# Patient Record
Sex: FEMALE | Race: WHITE | Employment: FULL TIME | URBAN - METROPOLITAN AREA
[De-identification: names, ages, dates, MRNs, and addresses within clinical notes are randomized per-mention and may not be internally consistent; named-entity substitution may affect disease eponyms.]

---

## 2023-09-06 ENCOUNTER — OFFICE VISIT (OUTPATIENT)
Age: 33
End: 2023-09-06
Payer: OTHER GOVERNMENT

## 2023-09-06 VITALS
WEIGHT: 209 LBS | SYSTOLIC BLOOD PRESSURE: 132 MMHG | DIASTOLIC BLOOD PRESSURE: 82 MMHG | HEART RATE: 83 BPM | TEMPERATURE: 98 F

## 2023-09-06 DIAGNOSIS — K59.00 CONSTIPATION, UNSPECIFIED CONSTIPATION TYPE: ICD-10-CM

## 2023-09-06 DIAGNOSIS — K64.9 HEMORRHOIDS, UNSPECIFIED HEMORRHOID TYPE: ICD-10-CM

## 2023-09-06 DIAGNOSIS — K62.5 PAINLESS RECTAL BLEEDING: ICD-10-CM

## 2023-09-06 DIAGNOSIS — Z78.9 NEVER SMOKED TOBACCO: ICD-10-CM

## 2023-09-06 DIAGNOSIS — K62.5 BRIGHT RED BLOOD PER RECTUM: ICD-10-CM

## 2023-09-06 DIAGNOSIS — K21.9 GASTROESOPHAGEAL REFLUX DISEASE, UNSPECIFIED WHETHER ESOPHAGITIS PRESENT: Primary | ICD-10-CM

## 2023-09-06 PROBLEM — E28.2 PCOS (POLYCYSTIC OVARIAN SYNDROME): Status: ACTIVE | Noted: 2023-09-06

## 2023-09-06 PROBLEM — J45.909 ASTHMA: Status: ACTIVE | Noted: 2023-09-06

## 2023-09-06 PROCEDURE — 99214 OFFICE O/P EST MOD 30 MIN: CPT | Performed by: FAMILY MEDICINE

## 2023-09-06 PROCEDURE — 46600 DIAGNOSTIC ANOSCOPY SPX: CPT | Performed by: STUDENT IN AN ORGANIZED HEALTH CARE EDUCATION/TRAINING PROGRAM

## 2023-09-06 PROCEDURE — 46600 DIAGNOSTIC ANOSCOPY SPX: CPT | Performed by: FAMILY MEDICINE

## 2023-09-06 RX ORDER — PANTOPRAZOLE SODIUM 20 MG/1
40 TABLET, DELAYED RELEASE ORAL
Qty: 180 TABLET | Refills: 0 | Status: SHIPPED | OUTPATIENT
Start: 2023-09-06 | End: 2023-12-05

## 2023-09-06 RX ORDER — TRANEXAMIC ACID 650 MG/1
1300 TABLET ORAL SEE ADMIN INSTRUCTIONS
COMMUNITY

## 2023-09-06 RX ORDER — FAMOTIDINE 10 MG
10 TABLET ORAL DAILY
COMMUNITY
End: 2023-09-06 | Stop reason: ALTCHOICE

## 2023-09-06 SDOH — ECONOMIC STABILITY: FOOD INSECURITY: WITHIN THE PAST 12 MONTHS, THE FOOD YOU BOUGHT JUST DIDN'T LAST AND YOU DIDN'T HAVE MONEY TO GET MORE.: NEVER TRUE

## 2023-09-06 SDOH — ECONOMIC STABILITY: INCOME INSECURITY: HOW HARD IS IT FOR YOU TO PAY FOR THE VERY BASICS LIKE FOOD, HOUSING, MEDICAL CARE, AND HEATING?: NOT HARD AT ALL

## 2023-09-06 SDOH — ECONOMIC STABILITY: HOUSING INSECURITY
IN THE LAST 12 MONTHS, WAS THERE A TIME WHEN YOU DID NOT HAVE A STEADY PLACE TO SLEEP OR SLEPT IN A SHELTER (INCLUDING NOW)?: NO

## 2023-09-06 SDOH — ECONOMIC STABILITY: FOOD INSECURITY: WITHIN THE PAST 12 MONTHS, YOU WORRIED THAT YOUR FOOD WOULD RUN OUT BEFORE YOU GOT MONEY TO BUY MORE.: NEVER TRUE

## 2023-09-06 ASSESSMENT — PATIENT HEALTH QUESTIONNAIRE - PHQ9
SUM OF ALL RESPONSES TO PHQ QUESTIONS 1-9: 0
2. FEELING DOWN, DEPRESSED OR HOPELESS: 0
SUM OF ALL RESPONSES TO PHQ QUESTIONS 1-9: 0
SUM OF ALL RESPONSES TO PHQ9 QUESTIONS 1 & 2: 0
1. LITTLE INTEREST OR PLEASURE IN DOING THINGS: 0
SUM OF ALL RESPONSES TO PHQ QUESTIONS 1-9: 0
SUM OF ALL RESPONSES TO PHQ QUESTIONS 1-9: 0

## 2023-09-06 NOTE — PROGRESS NOTES
[address] [facility]  [address] [facility] [place] [address] [facility]  Phone: [phone]  Fax: [phone]      Date of Visit: 23  Patient Name: Leonor Lowe   Patient :  1990     ASSESSMENT/PLAN   1. Gastroesophageal reflux disease, unspecified whether esophagitis present  Assessment & Plan:  - switched pt from OTC Pepcid 10mg qd to Protonix 40mg qd  - pt referred to GI for worsening GERD as well as painless BRBPR  Orders:  -     Blood Occult Stool Diagnostic; Future  -     CBC with Auto Differential; Future  -     Baldemar Cooper MD, Gastroenterology, [place]  -     pantoprazole (PROTONIX) 20 MG tablet; Take 2 tablets by mouth every morning (before breakfast), Disp-180 tablet, R-0Normal  The following orders have not been finalized:  -     pantoprazole (PROTONIX) 20 MG tablet  2. Bright red blood per rectum  -     Blood Occult Stool Diagnostic; Future  -     CBC with Auto Differential; Future  -     10197 - [facility]  -     Baldemar Cooper MD, Gastroenterology, [place]  3. Painless rectal bleeding  -     Blood Occult Stool Diagnostic; Future  -     CBC with Auto Differential; Future  -     45847 - [facility]  -     Baldemar Cooper MD, Gastroenterology, [place]  4. Hemorrhoids, unspecified hemorrhoid type  -     Blood Occult Stool Diagnostic; Future  -     CBC with Auto Differential; Future  -     60692 - [facility]  -     Gladys Lyons MD, Gastroenterology, [place]       Return in about 7 weeks (around 10/25/2023) for GI specialist follow-up/routine check. HPI    Leonor Lowe is a 28 y.o. female who presents today to discuss   Chief Complaint   Patient presents with    Rectal Bleeding   Presents with complaints of GERD symptoms, slowly worsening over the past year (since pregnancy/delivery of youngest child).   Symptoms experienced include burning sensation in chest and 81.6

## 2023-09-06 NOTE — ASSESSMENT & PLAN NOTE
- switched pt from OTC Pepcid 10mg qd to Protonix 40mg qd  - pt referred to GI for worsening GERD as well as painless BRBPR

## 2023-09-06 NOTE — PATIENT INSTRUCTIONS
Thank you for following up with us at [address] [facility]. office! It was a pleasure to meet you today! Our plan is the following:  - I have prescribed you Protonix 40 mg. This medication will replace the Pepcid that you are taking in the evening. You can continue taking it in the evening.    - I have generated a referral to a [facility] gastroenterologist. If you have not heard from them by Friday afternoon/Monday morning, you can call the number on the order sheet and schedule an appointment to get established. If you have any issues getting scheduled, please call the office to let me know. - I have ordered some lab work to make sure you are not anemic as a result of the bleeding you are experiencing. You can get this done at any lab as long as you have the paperwork in hand. Your medication list is included in the after visit summary. If you find any differences when compared to your medications at home, please contact the office ([phone]) to let us know. You can also call the office if you have any additional questions or concerns and speak to one of the staff. They will triage and forward the message to the provider. Have a great rest of your day!

## 2023-09-30 ENCOUNTER — HOSPITAL ENCOUNTER (EMERGENCY)
Age: 33
Discharge: HOME | End: 2023-09-30
Attending: EMERGENCY MEDICINE
Payer: OTHER GOVERNMENT

## 2023-09-30 VITALS
DIASTOLIC BLOOD PRESSURE: 106 MMHG | WEIGHT: 200 LBS | TEMPERATURE: 98.1 F | HEART RATE: 82 BPM | RESPIRATION RATE: 18 BRPM | HEIGHT: 64 IN | OXYGEN SATURATION: 99 % | BODY MASS INDEX: 34.15 KG/M2 | SYSTOLIC BLOOD PRESSURE: 154 MMHG

## 2023-09-30 DIAGNOSIS — T63.441A BEE STING REACTION, ACCIDENTAL OR UNINTENTIONAL, INITIAL ENCOUNTER: Primary | ICD-10-CM

## 2023-09-30 PROCEDURE — 99284 EMERGENCY DEPT VISIT MOD MDM: CPT

## 2023-09-30 PROCEDURE — 6370000000 HC RX 637 (ALT 250 FOR IP): Performed by: REGISTERED NURSE

## 2023-09-30 PROCEDURE — 96372 THER/PROPH/DIAG INJ SC/IM: CPT

## 2023-09-30 PROCEDURE — 6360000002 HC RX W HCPCS: Performed by: REGISTERED NURSE

## 2023-09-30 RX ORDER — FAMOTIDINE 20 MG/1
20 TABLET, FILM COATED ORAL 2 TIMES DAILY
Qty: 14 TABLET | Refills: 0 | Status: SHIPPED | OUTPATIENT
Start: 2023-09-30 | End: 2023-10-07

## 2023-09-30 RX ORDER — METHYLPREDNISOLONE 4 MG/1
TABLET ORAL
Qty: 1 KIT | Refills: 0 | Status: SHIPPED | OUTPATIENT
Start: 2023-09-30 | End: 2023-10-06

## 2023-09-30 RX ORDER — DEXAMETHASONE SODIUM PHOSPHATE 10 MG/ML
10 INJECTION, SOLUTION INTRAMUSCULAR; INTRAVENOUS ONCE
Status: COMPLETED | OUTPATIENT
Start: 2023-09-30 | End: 2023-09-30

## 2023-09-30 RX ORDER — FAMOTIDINE 20 MG/1
20 TABLET, FILM COATED ORAL ONCE
Status: COMPLETED | OUTPATIENT
Start: 2023-09-30 | End: 2023-09-30

## 2023-09-30 RX ADMIN — DEXAMETHASONE SODIUM PHOSPHATE 10 MG: 10 INJECTION, SOLUTION INTRAMUSCULAR; INTRAVENOUS at 17:57

## 2023-09-30 RX ADMIN — FAMOTIDINE 20 MG: 20 TABLET, FILM COATED ORAL at 17:57

## 2023-09-30 ASSESSMENT — ENCOUNTER SYMPTOMS
BACK PAIN: 0
FACIAL SWELLING: 1
ABDOMINAL PAIN: 0
COUGH: 0
DIARRHEA: 0
VOMITING: 0
NAUSEA: 0
SHORTNESS OF BREATH: 0

## 2023-09-30 ASSESSMENT — PAIN - FUNCTIONAL ASSESSMENT: PAIN_FUNCTIONAL_ASSESSMENT: NONE - DENIES PAIN

## 2023-09-30 NOTE — ED PROVIDER NOTES
[facility] Emergency Department  [address] [facility] [address]  Phone: [phone]  Fax: [phone]        Pt Name: Saul Quintanilla  MRN: [id number]  [address] [facility] [place] 1990  Date of evaluation: 9/30/23    [address] [facility]       Chief Complaint   Patient presents with    Allergic Reaction       HISTORY OF PRESENT ILLNESS (Location/Symptom, Timing/Onset, Context/Setting, Quality, Duration, Modifying Factors, Severity)      Saul Quintanilla is a 28 y.o. female with no pertinent PMH who presents to the ED via EMS with allergic reaction. Patient states approximately hour and half prior arrival she was stung by bee while drinking a beer and the bee stung her and her lips. She decided to go to a local urgent care due to increased swelling and noticed a hive to her face. She did take 2 Benadryl at home. She had to the urgent care they gave her a dose of epi, called EMS and brought her to the ER to be evaluated. She does report allergy to bees when she was younger but nothing significant. She denies any headache, dizziness, chest pain, shortness of breath, difficulty breathing, dumping nausea vomiting or diarrhea. On arrival she is resting on the cot comfortably with even unlabored breaths is nontoxic-appearing with no acute distress noted speaking full complete sentences. PAST MEDICAL / SURGICAL / SOCIAL / FAMILY HISTORY     PMH:  has no past medical history on file. Surgical History:  has no past surgical history on file. Social History:  reports that she has never smoked. She has never used smokeless tobacco. She reports that she does not use drugs. Family History: has no family status information on file. family history is not on file. Psychiatric History: None    Allergies: Bee venom and Sulfa antibiotics    Home Medications:   Prior to Admission medications    Medication Sig Start Date End Date Taking?  Authorizing Provider   methylPREDNISolone (MEDROL, ABDI,) 4 MG tablet discharge. PATIENT REFERRED TO:  [facility] Emergency Department  [address] [facility] [address] [id number] [phone]    If symptoms worsen      Please call your PCP in one day for follow up. If you do not have a PCP you can Call [phone] ([phone]) to be established with a PCP.           DISCHARGE MEDICATIONS:  Discharge Medication List as of 9/30/2023  7:16 PM        START taking these medications    Details   methylPREDNISolone (MEDROL, ABDI,) 4 MG tablet Take by mouth., Disp-1 kit, R-0Normal      famotidine (PEPCID) 20 MG tablet Take 1 tablet by mouth 2 times daily for 7 days, Disp-14 tablet, R-0Normal             OWEN Keith CNP   Emergency Medicine Nurse Practitioner    (Please note that portions of this note were completed with a voice recognition program.  Efforts were made to edit the dictations but occasionally words aremis-transcribed.)       OWEN Keith CNP  10/01/23 0028

## 2023-10-09 ENCOUNTER — TELEPHONE (OUTPATIENT)
Age: 33
End: 2023-10-09

## 2023-10-09 ENCOUNTER — OFFICE VISIT (OUTPATIENT)
Age: 33
End: 2023-10-09
Payer: OTHER GOVERNMENT

## 2023-10-09 VITALS
RESPIRATION RATE: 16 BRPM | DIASTOLIC BLOOD PRESSURE: 75 MMHG | OXYGEN SATURATION: 100 % | BODY MASS INDEX: 35.61 KG/M2 | HEART RATE: 85 BPM | WEIGHT: 208.6 LBS | SYSTOLIC BLOOD PRESSURE: 118 MMHG | HEIGHT: 64 IN | TEMPERATURE: 97.7 F

## 2023-10-09 DIAGNOSIS — J45.21 MILD INTERMITTENT ASTHMA WITH EXACERBATION: Primary | ICD-10-CM

## 2023-10-09 DIAGNOSIS — Z78.9 NONSMOKER: ICD-10-CM

## 2023-10-09 DIAGNOSIS — Z91.030 BEE STING ALLERGY: ICD-10-CM

## 2023-10-09 DIAGNOSIS — J45.21 MILD INTERMITTENT ASTHMA WITH EXACERBATION: ICD-10-CM

## 2023-10-09 PROCEDURE — 99211 OFF/OP EST MAY X REQ PHY/QHP: CPT

## 2023-10-09 PROCEDURE — 99214 OFFICE O/P EST MOD 30 MIN: CPT | Performed by: STUDENT IN AN ORGANIZED HEALTH CARE EDUCATION/TRAINING PROGRAM

## 2023-10-09 RX ORDER — FLUTICASONE PROPIONATE AND SALMETEROL 250; 50 UG/1; UG/1
1 POWDER RESPIRATORY (INHALATION) EVERY 12 HOURS
Qty: 60 EACH | Refills: 3 | Status: SHIPPED | OUTPATIENT
Start: 2023-10-09 | End: 2023-10-10 | Stop reason: SDUPTHER

## 2023-10-09 RX ORDER — EPINEPHRINE 0.3 MG/.3ML
0.3 INJECTION SUBCUTANEOUS ONCE
Qty: 0.6 ML | Refills: 0 | Status: SHIPPED | OUTPATIENT
Start: 2023-10-09 | End: 2023-10-09

## 2023-10-09 RX ORDER — FEXOFENADINE HCL 180 MG/1
180 TABLET ORAL DAILY
Qty: 30 TABLET | Refills: 3 | Status: SHIPPED | OUTPATIENT
Start: 2023-10-09 | End: 2023-11-08

## 2023-10-09 SDOH — ECONOMIC STABILITY: TRANSPORTATION INSECURITY
IN THE PAST 12 MONTHS, HAS LACK OF TRANSPORTATION KEPT YOU FROM MEETINGS, WORK, OR FROM GETTING THINGS NEEDED FOR DAILY LIVING?: NO

## 2023-10-09 SDOH — ECONOMIC STABILITY: INCOME INSECURITY: IN THE LAST 12 MONTHS, WAS THERE A TIME WHEN YOU WERE NOT ABLE TO PAY THE MORTGAGE OR RENT ON TIME?: NO

## 2023-10-09 SDOH — HEALTH STABILITY: PHYSICAL HEALTH: ON AVERAGE, HOW MANY MINUTES DO YOU ENGAGE IN EXERCISE AT THIS LEVEL?: 30 MIN

## 2023-10-09 SDOH — ECONOMIC STABILITY: TRANSPORTATION INSECURITY
IN THE PAST 12 MONTHS, HAS THE LACK OF TRANSPORTATION KEPT YOU FROM MEDICAL APPOINTMENTS OR FROM GETTING MEDICATIONS?: NO

## 2023-10-09 SDOH — HEALTH STABILITY: PHYSICAL HEALTH: ON AVERAGE, HOW MANY DAYS PER WEEK DO YOU ENGAGE IN MODERATE TO STRENUOUS EXERCISE (LIKE A BRISK WALK)?: 2 DAYS

## 2023-10-09 ASSESSMENT — LIFESTYLE VARIABLES
HOW MANY STANDARD DRINKS CONTAINING ALCOHOL DO YOU HAVE ON A TYPICAL DAY: 1 OR 2
HOW OFTEN DO YOU HAVE A DRINK CONTAINING ALCOHOL: 2-4 TIMES A MONTH

## 2023-10-09 NOTE — PROGRESS NOTES
[address] 100 Medicine Residency  [address] [facility], [address]  Phone: [phone]  Fax: [phone]      Date of Visit:  10/9/23   Patient Name: Jayson Gupta   Patient :  1990     HPI:     Jayson Gupta is a 28 y.o. female with   Patient Active Problem List   Diagnosis    Gastroesophageal reflux disease    Hemorrhoids    Painless rectal bleeding    Asthma    PCOS (polycystic ovarian syndrome)    Never smoked tobacco     who presents today to discuss   Chief Complaint   Patient presents with    Asthma     Flare up since last Thursday. Patient uses Pro air. Feels tightness in chest.        HPI:     Patient reports today that she feels she is having a possible active exacerbation. She has been using her albuterol inhaler every 6 hours yesterday and more frequently than usual with days before. Chest just feels tight, having some shortness of breath with exertion. Mother possibly noticed some wheezing while sitting with her yesterday, though patient denies any wheezing. No recent illness. No fevers. She is unsure if her allergies are contributing. The albuterol has significantly been helping the subjective shortness of breath. Was stung by a bee in the mouth on . Given epinephrine due to lip swelling. No true shortness of breath or other symptoms. Was on a Medrol dose pack for this up until Friday. She is requesting an EpiPen prescription today    Had pretty bad asthma all through childhood. Was on a maintenance inhaler for much of childhood. Has not been on a maintenance medication for the last several years. Normally does not require her albuterol. Patient denies any lightheadedness, dizziness, headaches, chest pain, palpitations, shortness of breath at rest, nausea, vomiting, numbness or tingling.     REVIEW OF SYSTEM      As in HPI    REVIEWED INFORMATION      Current Outpatient Medications   Medication Sig Dispense Refill    albuterol No

## 2023-10-09 NOTE — PATIENT INSTRUCTIONS
Thank for coming in today, Noemi Garzon. I am sending Allegra to your pharmacy as well as a new inhaler called [address] [place] [address] which is the new guideline directed therapy for asthma. Use your albuterol inhaler as needed while taking this. Call us with worsening or worsening wheezing. At this point, we will not prescribe steroids. Keep your appointment with Dr. Julia Mejia and you can discuss ordering a PFT or additional asthma workup at that time.

## 2023-10-10 RX ORDER — FLUTICASONE PROPIONATE AND SALMETEROL 250; 50 UG/1; UG/1
1 POWDER RESPIRATORY (INHALATION) EVERY 12 HOURS
Qty: 60 EACH | Refills: 3 | Status: SHIPPED | OUTPATIENT
Start: 2023-10-10

## 2023-10-23 ENCOUNTER — OFFICE VISIT (OUTPATIENT)
Dept: GASTROENTEROLOGY | Age: 33
End: 2023-10-23
Payer: OTHER GOVERNMENT

## 2023-10-23 VITALS
SYSTOLIC BLOOD PRESSURE: 141 MMHG | BODY MASS INDEX: 35.7 KG/M2 | WEIGHT: 208 LBS | DIASTOLIC BLOOD PRESSURE: 96 MMHG | TEMPERATURE: 97.5 F

## 2023-10-23 DIAGNOSIS — K62.5 PAINLESS RECTAL BLEEDING: Primary | ICD-10-CM

## 2023-10-23 DIAGNOSIS — K62.5 RECTAL BLEEDING: ICD-10-CM

## 2023-10-23 DIAGNOSIS — Z80.0 FAMILY HISTORY OF COLON CANCER: ICD-10-CM

## 2023-10-23 DIAGNOSIS — K21.9 GASTROESOPHAGEAL REFLUX DISEASE, UNSPECIFIED WHETHER ESOPHAGITIS PRESENT: ICD-10-CM

## 2023-10-23 PROCEDURE — 99204 OFFICE O/P NEW MOD 45 MIN: CPT | Performed by: INTERNAL MEDICINE

## 2023-10-23 RX ORDER — BISACODYL 5 MG/1
TABLET, DELAYED RELEASE ORAL
Qty: 4 TABLET | Refills: 0 | Status: SHIPPED | OUTPATIENT
Start: 2023-10-23

## 2023-10-23 RX ORDER — POLYETHYLENE GLYCOL 3350 17 G/17G
POWDER, FOR SOLUTION ORAL
Qty: 238 G | Refills: 0 | Status: SHIPPED | OUTPATIENT
Start: 2023-10-23

## 2023-10-23 ASSESSMENT — ENCOUNTER SYMPTOMS
BLOOD IN STOOL: 0
SHORTNESS OF BREATH: 0
CONSTIPATION: 0
ANAL BLEEDING: 0
TROUBLE SWALLOWING: 0
CHOKING: 0
NAUSEA: 0
VOMITING: 0
DIARRHEA: 0
VOICE CHANGE: 0
ABDOMINAL DISTENTION: 0
COUGH: 0
ABDOMINAL PAIN: 0
WHEEZING: 0
SORE THROAT: 0
RECTAL PAIN: 0

## 2023-10-23 NOTE — PROGRESS NOTES
GI NEW PATIENT OFFICE VISIT    INTERVAL HISTORY:   Ryan Erickson MD  [address] [facility],  [address] [facility]    Chief Complaint   Patient presents with    Hematochezia     Pt is here today as a NP due to BRBPR, hemorrhoids, & GERD. 1. Painless rectal bleeding    2. Gastroesophageal reflux disease, unspecified whether esophagitis present    3. Rectal bleeding    4. Family history of colon cancer      This patient evaluated in my office for the first time  She is a pleasant 27-year-old female patient has history significant for chronic acid reflux  Lately has been having some fresh rectal bleeding  Was recently evaluated primary care MD office blood is fresh suspected to have hemorrhoids? Has history for acid reflux  Had GI work-up done in the past  Has family history for colon cancer  No significant weight loss loss of appetite  Mild irritable bowel syndrome-like symptoms with abdominal bloating and gas  No smoking alcohol abuse illicit drug usage  Available records were reviewed with her      HISTORY OF PRESENT ILLNESS: Ms.Lindsey Cristofer Vargas is a 28 y.o. female with a past history remarkable for , referred for evaluation of   Chief Complaint   Patient presents with    Hematochezia     Pt is here today as a NP due to BRBPR, hemorrhoids, & GERD. Tim Pearce Past Medical,Family, and Social History reviewed and does contribute to the patient presenting condition. Patient's PMH/PSH,SH,PSYCH Hx, MEDs, ALLERGIES, and ROS were all reviewed and updated in the appropriate sections.     PAST MEDICAL HISTORY:  Past Medical History:   Diagnosis Date    Gestational hypertension     Migraines        Past Surgical History:   Procedure Laterality Date    WISDOM TOOTH EXTRACTION  2007       CURRENT MEDICATIONS:    Current Outpatient Medications:     fluticasone-salmeterol (ADVAIR DISKUS) 250-50 MCG/ACT AEPB diskus inhaler, Inhale 1 puff into the lungs in the morning and 1 puff in the evening., Disp: 60 each, Rfl: 3

## 2023-11-30 ENCOUNTER — TELEPHONE (OUTPATIENT)
Dept: GASTROENTEROLOGY | Age: 33
End: 2023-11-30

## 2023-12-01 PROBLEM — J45.20 MILD INTERMITTENT ASTHMA WITHOUT COMPLICATION: Status: ACTIVE | Noted: 2023-09-06

## 2024-02-06 PROBLEM — K64.0 GRADE I INTERNAL HEMORRHOIDS: Status: ACTIVE | Noted: 2023-09-06

## 2024-02-22 NOTE — DISCHARGE INSTRUCTIONS
At this time the patient is without objective evidence of an acute process requiring hospitalization or inpatient management. They have remained hemodynamically stable throughout their entire ED visit and are stable for discharge with outpatient follow-up. The patient understands that at this time there is no evidence for a more malignant underlying process, but the patient also understands that early in the process of an illness or injury, an emergency department workup can be falsely reassuring. Routine discharge counseling was given, and the patient understands that worsening, changing or persistent symptoms should prompt an immediate call or follow up with their primary physician or return to the emergency department. The importance of appropriate follow up was also discussed. I have reviewed the disposition diagnosis with the patient and or their family/guardian. I have answered their questions and given discharge instructions. They voiced understanding of these instructions and did not have any further questions or complaints. Call sooner with any questions or concerns.

## 2024-05-30 LAB — PAP SMEAR, EXTERNAL: NEGATIVE

## 2024-06-17 ENCOUNTER — HOSPITAL ENCOUNTER (OUTPATIENT)
Age: 34
Setting detail: SPECIMEN
Discharge: HOME | End: 2024-06-17

## 2024-06-17 ENCOUNTER — OFFICE VISIT (OUTPATIENT)
Age: 34
End: 2024-06-17
Payer: OTHER GOVERNMENT

## 2024-06-17 VITALS
HEART RATE: 94 BPM | SYSTOLIC BLOOD PRESSURE: 130 MMHG | BODY MASS INDEX: 35.85 KG/M2 | RESPIRATION RATE: 12 BRPM | HEIGHT: 64 IN | DIASTOLIC BLOOD PRESSURE: 83 MMHG | WEIGHT: 210 LBS

## 2024-06-17 DIAGNOSIS — Z80.0 FAMILY HISTORY OF COLON CANCER: ICD-10-CM

## 2024-06-17 DIAGNOSIS — Z78.9 FULL CODE STATUS: ICD-10-CM

## 2024-06-17 DIAGNOSIS — G43.909 MIGRAINE WITHOUT STATUS MIGRAINOSUS, NOT INTRACTABLE, UNSPECIFIED MIGRAINE TYPE: ICD-10-CM

## 2024-06-17 DIAGNOSIS — R51.9 FREQUENT HEADACHES: ICD-10-CM

## 2024-06-17 DIAGNOSIS — Z78.9 NEVER SMOKED TOBACCO: ICD-10-CM

## 2024-06-17 DIAGNOSIS — Z11.59 NEED FOR HEPATITIS C SCREENING TEST: ICD-10-CM

## 2024-06-17 DIAGNOSIS — Z00.00 WELL WOMAN EXAM (NO GYNECOLOGICAL EXAM): Primary | ICD-10-CM

## 2024-06-17 DIAGNOSIS — Z00.00 WELL WOMAN EXAM (NO GYNECOLOGICAL EXAM): ICD-10-CM

## 2024-06-17 DIAGNOSIS — R03.0 ELEVATED BP WITHOUT DIAGNOSIS OF HYPERTENSION: ICD-10-CM

## 2024-06-17 PROBLEM — K21.00 GASTROESOPHAGEAL REFLUX DISEASE WITH ESOPHAGITIS WITHOUT HEMORRHAGE: Status: ACTIVE | Noted: 2023-09-06

## 2024-06-17 LAB
ANION GAP SERPL CALCULATED.3IONS-SCNC: 11 MMOL/L (ref 9–16)
BASOPHILS NFR BLD: 0 % (ref 0–2)
BUN SERPL-MCNC: 18 MG/DL (ref 6–20)
CHLORIDE SERPL-SCNC: 104 MMOL/L (ref 98–107)
CHOLEST SERPL-MCNC: 193 MG/DL (ref 0–199)
CHOLESTEROL/HDL RATIO: 3
CO2 SERPL-SCNC: 23 MMOL/L (ref 20–31)
CREAT SERPL-MCNC: 1 MG/DL (ref 0.5–0.9)
EOSINOPHIL # BLD: 0.27 K/UL (ref 0–0.44)
EOSINOPHILS RELATIVE PERCENT: 3 % (ref 1–4)
ERYTHROCYTE [DISTWIDTH] IN BLOOD BY AUTOMATED COUNT: 12.3 % (ref 11.8–14.4)
EST. AVERAGE GLUCOSE BLD GHB EST-MCNC: 91 MG/DL
GFR, ESTIMATED: 78 ML/MIN/1.73M2
GLUCOSE SERPL-MCNC: 72 MG/DL (ref 74–99)
HBA1C MFR BLD: 4.8 % (ref 4–6)
HCT VFR BLD AUTO: 39 % (ref 36.3–47.1)
HCV AB SERPL QL IA: NONREACTIVE
HDLC SERPL-MCNC: 66 MG/DL
HGB BLD-MCNC: 13.1 G/DL (ref 11.9–15.1)
IMM GRANULOCYTES # BLD AUTO: 0.03 K/UL (ref 0–0.3)
IMM GRANULOCYTES NFR BLD: 0 %
LDLC SERPL CALC-MCNC: 112 MG/DL (ref 0–100)
LYMPHOCYTES NFR BLD: 2.09 K/UL (ref 1.1–3.7)
LYMPHOCYTES RELATIVE PERCENT: 24 % (ref 24–43)
MCH RBC QN AUTO: 30 PG (ref 25.2–33.5)
MCV RBC AUTO: 89.4 FL (ref 82.6–102.9)
MONOCYTES NFR BLD: 0.65 K/UL (ref 0.1–1.2)
MONOCYTES NFR BLD: 7 % (ref 3–12)
NEUTROPHILS NFR BLD: 66 % (ref 36–65)
NEUTS SEG NFR BLD: 5.7 K/UL (ref 1.5–8.1)
NRBC BLD-RTO: 0 PER 100 WBC
PLATELET # BLD AUTO: 298 K/UL (ref 138–453)
PMV BLD AUTO: 11.3 FL (ref 8.1–13.5)
POTASSIUM SERPL-SCNC: 4.1 MMOL/L (ref 3.7–5.3)
SODIUM SERPL-SCNC: 138 MMOL/L (ref 136–145)
TRIGL SERPL-MCNC: 73 MG/DL
VLDLC SERPL CALC-MCNC: 15 MG/DL
WBC OTHER # BLD: 8.8 K/UL (ref 3.5–11.3)

## 2024-06-17 PROCEDURE — 99395 PREV VISIT EST AGE 18-39: CPT | Performed by: FAMILY MEDICINE

## 2024-06-17 PROCEDURE — 99211 OFF/OP EST MAY X REQ PHY/QHP: CPT | Performed by: STUDENT IN AN ORGANIZED HEALTH CARE EDUCATION/TRAINING PROGRAM

## 2024-06-17 SDOH — ECONOMIC STABILITY: INCOME INSECURITY: IN THE LAST 12 MONTHS, WAS THERE A TIME WHEN YOU WERE NOT ABLE TO PAY THE MORTGAGE OR RENT ON TIME?: NO

## 2024-06-17 SDOH — ECONOMIC STABILITY: HOUSING INSECURITY: IN THE LAST 12 MONTHS, HOW MANY PLACES HAVE YOU LIVED?: 1

## 2024-06-17 ASSESSMENT — SOCIAL DETERMINANTS OF HEALTH (SDOH)
HOW OFTEN DO YOU ATTEND CHURCH OR RELIGIOUS SERVICES?: MORE THAN 4 TIMES PER YEAR
HOW OFTEN DO YOU ATTENT MEETINGS OF THE CLUB OR ORGANIZATION YOU BELONG TO?: NEVER
IN A TYPICAL WEEK, HOW MANY TIMES DO YOU TALK ON THE PHONE WITH FAMILY, FRIENDS, OR NEIGHBORS?: MORE THAN THREE TIMES A WEEK
DO YOU BELONG TO ANY CLUBS OR ORGANIZATIONS SUCH AS CHURCH GROUPS UNIONS, FRATERNAL OR ATHLETIC GROUPS, OR SCHOOL GROUPS?: NO
HOW OFTEN DO YOU GET TOGETHER WITH FRIENDS OR RELATIVES?: ONCE A WEEK

## 2024-06-17 ASSESSMENT — PATIENT HEALTH QUESTIONNAIRE - PHQ9
1. LITTLE INTEREST OR PLEASURE IN DOING THINGS: NOT AT ALL
SUM OF ALL RESPONSES TO PHQ9 QUESTIONS 1 & 2: 0
SUM OF ALL RESPONSES TO PHQ QUESTIONS 1-9: 0
2. FEELING DOWN, DEPRESSED OR HOPELESS: NOT AT ALL
SUM OF ALL RESPONSES TO PHQ QUESTIONS 1-9: 0

## 2024-06-17 NOTE — PATIENT INSTRUCTIONS
Thank you for following up with us at [facility] Family Medicine office! It was a pleasure to meet you today!     Our plan is the following:  - Because of your frequent episodes of elevated BP at the urgent care in your OB/GYN, I have ordered some labs usually ordered for hypertension so we can make sure your kidneys are doing okay and we can figure out if you have other risk factors for hypertension. We'll discuss your lab results at your next appointment.    - I will also bring you back in 2 weeks so we can discuss the recent change in your headache/migraines.     - We talked about using apps to track your headache symptoms; the two I like are HeadApp! And Migraine Charanjit. I like using these apps to track migraines and headaches, because it can help you figure out triggers for both. These apps will help you track your symptoms, possible triggers, and will help us determine how many headache versus how many discrete headaches you are having.    - There can be four phases to a migraine- the prodrome, aura, headache, and postdrome. Using the tracker can help us determine if you have a prodrome or aura, which symptoms you experience, and help us figure out when the best time for you to take your migraine abortive. If you'd like more information, you can go to the American [employer] Foundation's website, they have a lot of good information.       From the American Migraine [employer]      I will be graduating at the end of June, but I plan on moving to [facility] ([address] [place], [place] [id number]. Phone number [phone]). If you would like for me to continue being your family physician, I can continue seeing you at that location in September. If you need to be seen before that time, you should call the [facility] office to schedule an appointment.    If you would like to continue being seen at the [facility] Office, you will be reassigned

## 2024-06-17 NOTE — PROGRESS NOTES
95.3 kg (210 lb)   BMI 36.05 kg/m²    Vital signs reviewed. Nursing note reviewed.     General: Alert and oriented, well nourished, in NAD   HEENT: Normocephalic and atraumatic. No scleral icterus or conjunctival injection.   Lungs: Clear to auscultation in bilateral fields, non-labored respiration.  Heart: Normal rate, regular rhythm. No murmur, gallop, or rubs  Gastrointestinal: Abdomen is soft, nontender, and non-distended.   Skin: Skin is warm, dry and appropriate for skin tone. No rashes or lesions noted on exposed skin. No concerning moles or lesions noted.   MSK/Extremities: No edema in bilateral lower extremities. Radial and posterior tib pulses 2+ bilaterally.  Neuro: Awake, alert, and oriented X3. Appears to be at baseline. Normal gait. No difficulties answering questions.   Psych: Cooperative, appropriate mood and affect.    LABS     No visits with results within 3 Month(s) from this visit.   Latest known visit with results is:   No results found for any previous visit.        No results found for this visit on 06/17/24.

## 2024-06-18 LAB
CREAT UR-MCNC: 31 MG/DL (ref 28–217)
MICROALBUMIN UR-MCNC: <12 MG/L (ref 0–20)
MICROALBUMIN/CREAT UR-RTO: NORMAL MCG/MG CREAT (ref 0–25)

## 2024-06-23 PROBLEM — R03.0 ELEVATED BP WITHOUT DIAGNOSIS OF HYPERTENSION: Status: ACTIVE | Noted: 2024-06-23

## 2024-06-23 NOTE — ASSESSMENT & PLAN NOTE
With PMH Of PCOS and pt's report of elevated Bps at other appointments, will check A1c and lipid panel as well as HTN labs  Change in headaches may be d/t increased BP, so will discuss HTN and change in headaches at follow-up

## 2024-06-25 NOTE — PATIENT INSTRUCTIONS
Thank you for following up with us at [facility] Family Medicine office. It was a pleasure to meet you today!     Our plan is the following:  We discussed your labs today  Lipids:  LDL is slightly elevated, continue working on diet and exercise  Besides helping you lose weight, increased physical activity can lower your triglycerides, the most common type of fat in your body, while increasing your HDL levels. Benefits can be seen with as little as 60 minutes of moderate intensity aerobic exercise a week. In terms of diet, try to avoid trans fats, as they can increase LDL cholesterol and lower HDL cholesterol levels. Foods prepared with shortening, such as cakes and cookies, often contain trans fats, as do most fried foods and some margarines. Limit saturated fat, found in meats and full-fat dairy products, as well.  If you smoke, find a way to quit. Smoking lowers HDL levels, especially in women, and increases LDL levels and triglycerides.    Labs  Creatinine is 1.0, would repeat BMP - if elevated would do further workup. Please hydrate before lab  HTN  Continue to monitor BP and bring home readings to next appointment  Bring BP cuff to next appointment to compare size  Migraine   Message your magnesium dose  Can increase magnesium dose for migraine prevention  Consider osteopathic referral to learn techniques for headache  We did prescribe Maxalt 10 mg as needed for migraine headaches, if no improvement or worsening symptoms come in sooner  Discussed preventative options for headaches  Follow up in 2-3 weeks for headaches  Consider MRI if no improvement or worsening symptoms   Call us right away if new symptoms or symptoms change    Your medication list is included in the after visit summary. If you find any differences when compared to your medications at home, please contact the office ([phone]) to let us know.   You can also call the office if you have any additional questions or

## 2024-06-25 NOTE — PROGRESS NOTES
Diagnosis    Gastroesophageal reflux disease with esophagitis without hemorrhage    Grade I internal hemorrhoids    Rectal bleeding    Mild intermittent asthma without complication    PCOS (polycystic ovarian syndrome)    Never smoked tobacco    Family history of colon cancer    Frequent headaches    Migraine without status migrainosus, not intractable    Elevated BP without diagnosis of hypertension       Past Medical History:   Diagnosis Date    Asthma 2000    Gestational hypertension     Migraines     Obesity 2017       Past Surgical History:   Procedure Laterality Date    UPPER GASTROINTESTINAL ENDOSCOPY  January 2024    WISDOM TOOTH EXTRACTION  2007        Social History     Socioeconomic History    Marital status:      Spouse name: None    Number of children: 3    Years of education: None    Highest education level: None   Tobacco Use    Smoking status: Never    Smokeless tobacco: Never   Vaping Use    Vaping Use: Never used   Substance and Sexual Activity    Alcohol use: Yes     Alcohol/week: 2.0 standard drinks of alcohol     Types: 2 Glasses of wine per week     Comment: social drinker. Not an every week occurance    Drug use: Never    Sexual activity: Yes     Partners: Male     Comment: . 3 kids...   Social History Narrative    ** Merged History Encounter **          Social Determinants of Health     Financial Resource Strain: Low Risk  (9/6/2023)    Overall Financial Resource Strain (CARDIA)     Difficulty of Paying Living Expenses: Not hard at all   Transportation Needs: No Transportation Needs (10/9/2023)    PRAPARE - Transportation     Lack of Transportation (Medical): No     Lack of Transportation (Non-Medical): No   Physical Activity: Insufficiently Active (10/9/2023)    Exercise Vital Sign     Days of Exercise per Week: 2 days     Minutes of Exercise per Session: 30 min   Stress: No Stress Concern Present (10/9/2023)    [employer] - Occupational Stress

## 2024-07-03 ENCOUNTER — OFFICE VISIT (OUTPATIENT)
Age: 34
End: 2024-07-03
Payer: OTHER GOVERNMENT

## 2024-07-03 VITALS
SYSTOLIC BLOOD PRESSURE: 131 MMHG | HEART RATE: 84 BPM | RESPIRATION RATE: 14 BRPM | DIASTOLIC BLOOD PRESSURE: 75 MMHG | BODY MASS INDEX: 35.98 KG/M2 | WEIGHT: 209.6 LBS

## 2024-07-03 DIAGNOSIS — G43.009 MIGRAINE WITHOUT AURA AND WITHOUT STATUS MIGRAINOSUS, NOT INTRACTABLE: ICD-10-CM

## 2024-07-03 DIAGNOSIS — R79.89 ELEVATED SERUM CREATININE: Primary | ICD-10-CM

## 2024-07-03 DIAGNOSIS — R03.0 ELEVATED BP WITHOUT DIAGNOSIS OF HYPERTENSION: ICD-10-CM

## 2024-07-03 PROCEDURE — 99211 OFF/OP EST MAY X REQ PHY/QHP: CPT

## 2024-07-03 PROCEDURE — 99214 OFFICE O/P EST MOD 30 MIN: CPT

## 2024-07-03 RX ORDER — RIZATRIPTAN BENZOATE 10 MG/1
10 TABLET ORAL
Qty: 9 TABLET | Refills: 0 | Status: SHIPPED | OUTPATIENT
Start: 2024-07-03 | End: 2024-07-03

## 2024-07-10 ENCOUNTER — HOSPITAL ENCOUNTER (OUTPATIENT)
Age: 34
Setting detail: SPECIMEN
Discharge: HOME | End: 2024-07-10

## 2024-07-10 DIAGNOSIS — R79.89 ELEVATED SERUM CREATININE: ICD-10-CM

## 2024-07-11 LAB
ANION GAP SERPL CALCULATED.3IONS-SCNC: 11 MMOL/L (ref 9–16)
BUN SERPL-MCNC: 14 MG/DL (ref 6–20)
CALCIUM SERPL-MCNC: 9.4 MG/DL (ref 8.6–10.4)
CHLORIDE SERPL-SCNC: 103 MMOL/L (ref 98–107)
CO2 SERPL-SCNC: 26 MMOL/L (ref 20–31)
CREAT SERPL-MCNC: 1.1 MG/DL (ref 0.5–0.9)
GFR, ESTIMATED: 69 ML/MIN/1.73M2
GLUCOSE SERPL-MCNC: 56 MG/DL (ref 74–99)
POTASSIUM SERPL-SCNC: 3.8 MMOL/L (ref 3.7–5.3)
SODIUM SERPL-SCNC: 140 MMOL/L (ref 136–145)

## 2024-07-23 NOTE — PROGRESS NOTES
[facility] Family Medicine Residency  [address] [place] [address]  Phone: [phone]  Fax: [phone]      Date of Visit: 2024   Patient Name: Laly Mcnamara   Patient :  1990     ASSESSMENT/PLAN   Migraines   A. Life Style Changes:    1. Follow the diet and exercise recommendations for elevated BP/HTN discussed below    2. Avoid triggers such as  Alcohol  Sudden withdrawal of caffeine  Strong smells  Overuse of nonprescription medications  Stressors  3. Adequate sleep hygiene  Use the bedroom/bed for sleep/rest  Avoid alcohol and caffeine close to bedtime  Avoid daytime naps  Avoid large meals, large fluid intake, and bright lights (including electronic screens) before bed time   B. Continue Maxalt(rizatriptan)    1. Take 1 tablet PO once prn for migraine; may repeat in 2 hours if needed    2. Continue magnesium 400 mg as needed              C. Will consider prophylactic migraine medication if no significant improvement in migraines occurs at next visit    Elevated Blood Pressure  A. Life Style Changes   DASH Diet: Rich in fruits, vegetables, whole grains; low sodium and alcohol  Physical Activity:  min per week  Aerobic: Walking, Jogging, Cycling, Cardio Equipment(I.e. treadmill, elliptical)  Weight training  3. Continue Home BP log and bring to the clinic  Please bring home BP cuff to next visit  B. Consider medications if BP remains elevated with despite lifestyle changes at next visit          COMMUNICATION   Please be aware portions of this note were completed using voice recognition software and unforeseen errors may have occurred.  Questions/concerns answered. Patient verbalized and expressed understanding. Medications, laboratory testing, imaging, consultation, and follow up as documented in this encounter.     Patient was seen and discussed with Abigail Sharma DO.  Electronically signed by Sue Ortega MD on 2024 at

## 2024-07-24 ENCOUNTER — OFFICE VISIT (OUTPATIENT)
Age: 34
End: 2024-07-24
Payer: OTHER GOVERNMENT

## 2024-07-24 VITALS
RESPIRATION RATE: 14 BRPM | BODY MASS INDEX: 35.51 KG/M2 | TEMPERATURE: 98.7 F | SYSTOLIC BLOOD PRESSURE: 118 MMHG | HEIGHT: 64 IN | WEIGHT: 208 LBS | HEART RATE: 87 BPM | DIASTOLIC BLOOD PRESSURE: 76 MMHG

## 2024-07-24 DIAGNOSIS — R03.0 ELEVATED BLOOD PRESSURE READING WITHOUT DIAGNOSIS OF HYPERTENSION: ICD-10-CM

## 2024-07-24 DIAGNOSIS — G43.709 CHRONIC MIGRAINE WITHOUT AURA WITHOUT STATUS MIGRAINOSUS, NOT INTRACTABLE: Primary | ICD-10-CM

## 2024-07-24 PROCEDURE — G8417 CALC BMI ABV UP PARAM F/U: HCPCS | Performed by: FAMILY MEDICINE

## 2024-07-24 PROCEDURE — 1036F TOBACCO NON-USER: CPT | Performed by: FAMILY MEDICINE

## 2024-07-24 PROCEDURE — G8427 DOCREV CUR MEDS BY ELIG CLIN: HCPCS | Performed by: FAMILY MEDICINE

## 2024-07-24 PROCEDURE — 99214 OFFICE O/P EST MOD 30 MIN: CPT | Performed by: FAMILY MEDICINE

## 2024-07-24 RX ORDER — RIZATRIPTAN BENZOATE 10 MG/1
10 TABLET ORAL
Qty: 9 TABLET | Refills: 0 | Status: SHIPPED | OUTPATIENT
Start: 2024-07-24 | End: 2024-07-24

## 2024-07-24 RX ORDER — OMEPRAZOLE 40 MG/1
40 CAPSULE, DELAYED RELEASE ORAL DAILY
COMMUNITY

## 2024-07-24 NOTE — PATIENT INSTRUCTIONS
Thank you for following up with us at [facility] Family Medicine office. It was a pleasure to meet you today!     Our plan is the following:  Migraines   A. Life Style Changes:    1. Follow the diet and exercise recommendations for elevated BP/HTN discussed below    2. Avoid triggers such as  Alcohol  Sudden withdrawal of caffeine  Strong smells  Overuse of nonprescription medications  Stressors  3. Adequate sleep hygiene  Use the bedroom/bed for sleep/rest  Avoid alcohol and caffeine close to bedtime  Avoid daytime naps  Avoid large meals, large fluid intake, and bright lights (including electronic screens) before bed time   B. Continue Maxalt(rizatriptan)    1. Take 1 tablet PO once prn for migraine; may repeat in 2 hours if needed    2. Continue magnesium 400 mg as needed              C. Will consider if prophylactic migraine medication if no significant improvement in migraines occurs at next visit    Elevated Blood Pressure  A. Life Style Changes   DASH Diet: Rich in fruits, vegetables, whole grains; low sodium and alcohol  Physical Activity:  min per week  Aerobic: Walking, Jogging, Cycling, Cardio Equipment(I.e. treadmill, elliptical)  Weight training  3. Continue Home BP log and bring to the clinic  Please bring home BP cuff to next visit  B. Consider medications if BP remains elevated with despite lifestyle changes at next visit  Return to clinic in about 1 month    Your medication list is included in the after visit summary. If you find any differences when compared to your medications at home, please contact the office ([phone]) to let us know.   You can also call the office if you have any additional questions or concerns and speak to one of the staff. They will triage and forward the message to the provider.   Have a great rest of your day!

## 2024-08-11 NOTE — PATIENT INSTRUCTIONS
Thank you for following up with us at [facility] office. It was a pleasure to meet you today!     Our plan is the following:    Elevated Blood Pressure  DASH Diet: Rich in fruits, vegetables, whole grains; low sodium and alcohol  Increase Physical Activity:  min per week   Aerobic: Walking, Jogging, Cycling, Cardio Equipment(I.e. treadmill, elliptical)  Weight training  Bring BP cuff to next visit and show it to the nurse  Migraines  Continue to avoid triggers  Continue to sufficient sleep hygiene routine  Start topiramate 25 mg daily  Return to the clinic to clinic in 2 weeks to see for medication management  Topiramate can also promote weight loss  Please return to clinic in 2-4 weeks for follow weight  Weight Loss  Start Topiramate 25 mg daily  Continue diet and exercise recommendations mentioned above    Your medication list is included in the after visit summary. If you find any differences when compared to your medications at home, please contact the office ([phone]) to let us know.   You can also call the office if you have any additional questions or concerns and speak to one of the staff. They will triage and forward the message to the provider.   Have a great rest of your day!

## 2024-08-14 ENCOUNTER — OFFICE VISIT (OUTPATIENT)
Age: 34
End: 2024-08-14
Payer: OTHER GOVERNMENT

## 2024-08-14 VITALS
DIASTOLIC BLOOD PRESSURE: 85 MMHG | HEART RATE: 89 BPM | WEIGHT: 209.2 LBS | RESPIRATION RATE: 18 BRPM | TEMPERATURE: 97.5 F | SYSTOLIC BLOOD PRESSURE: 133 MMHG | BODY MASS INDEX: 35.91 KG/M2

## 2024-08-14 DIAGNOSIS — R03.0 ELEVATED BP WITHOUT DIAGNOSIS OF HYPERTENSION: ICD-10-CM

## 2024-08-14 DIAGNOSIS — G43.709 CHRONIC MIGRAINE WITHOUT AURA WITHOUT STATUS MIGRAINOSUS, NOT INTRACTABLE: Primary | ICD-10-CM

## 2024-08-14 DIAGNOSIS — R63.4 WEIGHT LOSS: ICD-10-CM

## 2024-08-14 PROCEDURE — 1036F TOBACCO NON-USER: CPT

## 2024-08-14 PROCEDURE — G8417 CALC BMI ABV UP PARAM F/U: HCPCS

## 2024-08-14 PROCEDURE — G8427 DOCREV CUR MEDS BY ELIG CLIN: HCPCS

## 2024-08-14 PROCEDURE — 99203 OFFICE O/P NEW LOW 30 MIN: CPT

## 2024-08-14 PROCEDURE — 99214 OFFICE O/P EST MOD 30 MIN: CPT

## 2024-08-14 RX ORDER — TOPIRAMATE SPINKLE 25 MG/1
25 CAPSULE ORAL DAILY
Qty: 30 CAPSULE | Refills: 0 | Status: SHIPPED | OUTPATIENT
Start: 2024-08-14 | End: 2024-09-13

## 2024-08-14 NOTE — PROGRESS NOTES
[facility]  [address] [place] [address]  Phone: [phone]  Fax: [phone]      Date of Visit: 2024   Patient Name: Laly Mcnamara   Patient :  1990     ASSESSMENT/PLAN   Thank you for following up with us at [facility] office. It was a pleasure to meet you today!     Our plan is the following:  Weight loss    Elevated Blood Pressure  DASH Diet: Rich in fruits, vegetables, whole grains; low sodium and alcohol  Increase Physical Activity:  min per week   Aerobic: Walking, Jogging, Cycling, Cardio Equipment(I.e. treadmill, elliptical)  Weight training  Bring BP cuff to next visit and show it to the nurse  Migraines  Continue to avoid triggers  Continue to sufficient sleep hygiene routine  Start topiramate 25 mg daily  Return to the clinic to clinic in 2 weeks for medication management  Topiramate can also promote weight loss  Please follow up in 2-4 weeks    Your medication list is included in the after visit summary. If you find any differences when compared to your medications at home, please contact the office ([phone]) to let us know.   You can also call the office if you have any additional questions or concerns and speak to one of the staff. They will triage and forward the message to the provider.   Have a great rest of your day!      COMMUNICATION   Please be aware portions of this note were completed using voice recognition software and unforeseen errors may have occurred.  Questions/concerns answered. Patient verbalized and expressed understanding. Medications, laboratory testing, imaging, consultation, and follow up as documented in this encounter.     Patient was seen and discussed with Brad Miranda MD.  Electronically signed by Sue Ortega MD on 2024 at 7:34 AM    HPI    Laly Mcnamara is a 34 y.o. female who presents today to follow up

## 2024-09-02 DIAGNOSIS — G43.709 CHRONIC MIGRAINE WITHOUT AURA WITHOUT STATUS MIGRAINOSUS, NOT INTRACTABLE: ICD-10-CM

## 2024-09-04 RX ORDER — RIZATRIPTAN BENZOATE 10 MG/1
TABLET ORAL
Qty: 9 TABLET | Refills: 0 | OUTPATIENT
Start: 2024-09-04

## 2024-09-17 SDOH — ECONOMIC STABILITY: INCOME INSECURITY: HOW HARD IS IT FOR YOU TO PAY FOR THE VERY BASICS LIKE FOOD, HOUSING, MEDICAL CARE, AND HEATING?: NOT VERY HARD

## 2024-09-17 SDOH — ECONOMIC STABILITY: FOOD INSECURITY: WITHIN THE PAST 12 MONTHS, THE FOOD YOU BOUGHT JUST DIDN'T LAST AND YOU DIDN'T HAVE MONEY TO GET MORE.: NEVER TRUE

## 2024-09-17 SDOH — ECONOMIC STABILITY: FOOD INSECURITY: WITHIN THE PAST 12 MONTHS, YOU WORRIED THAT YOUR FOOD WOULD RUN OUT BEFORE YOU GOT MONEY TO BUY MORE.: NEVER TRUE

## 2024-09-18 ENCOUNTER — OFFICE VISIT (OUTPATIENT)
Age: 34
End: 2024-09-18
Payer: OTHER GOVERNMENT

## 2024-09-18 VITALS
SYSTOLIC BLOOD PRESSURE: 92 MMHG | DIASTOLIC BLOOD PRESSURE: 70 MMHG | HEIGHT: 64 IN | BODY MASS INDEX: 35.41 KG/M2 | HEART RATE: 69 BPM | RESPIRATION RATE: 14 BRPM | WEIGHT: 207.4 LBS | TEMPERATURE: 97.8 F

## 2024-09-18 DIAGNOSIS — E78.5 DYSLIPIDEMIA: ICD-10-CM

## 2024-09-18 DIAGNOSIS — G43.709 CHRONIC MIGRAINE WITHOUT AURA WITHOUT STATUS MIGRAINOSUS, NOT INTRACTABLE: ICD-10-CM

## 2024-09-18 DIAGNOSIS — R63.5 WEIGHT GAIN: ICD-10-CM

## 2024-09-18 DIAGNOSIS — R03.0 ELEVATED BLOOD PRESSURE READING WITHOUT DIAGNOSIS OF HYPERTENSION: ICD-10-CM

## 2024-09-18 DIAGNOSIS — E66.09 CLASS 2 OBESITY DUE TO EXCESS CALORIES WITHOUT SERIOUS COMORBIDITY WITH BODY MASS INDEX (BMI) OF 35.0 TO 35.9 IN ADULT: Primary | ICD-10-CM

## 2024-09-18 PROCEDURE — 99212 OFFICE O/P EST SF 10 MIN: CPT

## 2024-09-18 PROCEDURE — 1036F TOBACCO NON-USER: CPT

## 2024-09-18 PROCEDURE — G8427 DOCREV CUR MEDS BY ELIG CLIN: HCPCS

## 2024-09-18 PROCEDURE — 99214 OFFICE O/P EST MOD 30 MIN: CPT

## 2024-09-18 PROCEDURE — G8417 CALC BMI ABV UP PARAM F/U: HCPCS

## 2024-09-18 RX ORDER — SEMAGLUTIDE 0.25 MG/.5ML
0.25 INJECTION, SOLUTION SUBCUTANEOUS
Qty: 2 ML | Refills: 0 | Status: SHIPPED | OUTPATIENT
Start: 2024-09-18 | End: 2024-09-24 | Stop reason: SDUPTHER

## 2024-09-23 ENCOUNTER — TELEPHONE (OUTPATIENT)
Age: 34
End: 2024-09-23

## 2024-09-23 DIAGNOSIS — E78.5 DYSLIPIDEMIA: ICD-10-CM

## 2024-09-23 DIAGNOSIS — E66.09 CLASS 2 OBESITY DUE TO EXCESS CALORIES WITHOUT SERIOUS COMORBIDITY WITH BODY MASS INDEX (BMI) OF 35.0 TO 35.9 IN ADULT: ICD-10-CM

## 2024-09-24 RX ORDER — SEMAGLUTIDE 0.25 MG/.5ML
0.25 INJECTION, SOLUTION SUBCUTANEOUS
Qty: 2 ML | Refills: 0 | Status: SHIPPED | OUTPATIENT
Start: 2024-09-24 | End: 2024-10-22

## 2024-09-26 ENCOUNTER — PROCEDURE VISIT (OUTPATIENT)
Age: 34
End: 2024-09-26
Payer: OTHER GOVERNMENT

## 2024-09-26 VITALS
SYSTOLIC BLOOD PRESSURE: 114 MMHG | RESPIRATION RATE: 18 BRPM | BODY MASS INDEX: 35.41 KG/M2 | WEIGHT: 206.3 LBS | DIASTOLIC BLOOD PRESSURE: 51 MMHG | TEMPERATURE: 97.3 F | HEART RATE: 83 BPM

## 2024-09-26 DIAGNOSIS — M99.08 RIB CAGE REGION SOMATIC DYSFUNCTION: ICD-10-CM

## 2024-09-26 DIAGNOSIS — M99.02 SOMATIC DYSFUNCTION OF SPINE, THORACIC: ICD-10-CM

## 2024-09-26 DIAGNOSIS — M54.2 NECK PAIN: ICD-10-CM

## 2024-09-26 DIAGNOSIS — M99.00 SOMATIC DYSFUNCTION OF HEAD REGION: ICD-10-CM

## 2024-09-26 DIAGNOSIS — G43.709 CHRONIC MIGRAINE WITHOUT AURA WITHOUT STATUS MIGRAINOSUS, NOT INTRACTABLE: Primary | ICD-10-CM

## 2024-09-26 DIAGNOSIS — M99.01 SOMATIC DYSFUNCTION OF SPINE, CERVICAL: ICD-10-CM

## 2024-09-26 PROCEDURE — 99213 OFFICE O/P EST LOW 20 MIN: CPT | Performed by: FAMILY MEDICINE

## 2024-09-26 PROCEDURE — 98926 OSTEOPATH MANJ 3-4 REGIONS: CPT | Performed by: FAMILY MEDICINE

## 2024-10-14 NOTE — PATIENT INSTRUCTIONS
Thank you for following up with us at [facility] office. It was a pleasure to meet you today!     Our plan is the following:  Obesity  Weight gain  Dyslipidemia  Increase semaglutide to 0.5 mg/week for 4 weeks  Continue to low fat,  low salt diet  Continue to exercise for at least 150 min; majority of exercise should be at moderate or vigorous level of activity  Chronic Migraines  Stop taking propronol  Start venlafaxine 37.5 mg daily  Will Refill Maxalt  Monitor of severity of migraines  Elevated Blood Pressure, Controlled  Continue current exercise and diet habits  Continue to measure BP home and keep a log on your phone or message them to me     Your medication list is included in the after visit summary. If you find any differences when compared to your medications at home, please contact the office ([phone]) to let us know.   You can also call the office if you have any additional questions or concerns and speak to one of the staff. They will triage and forward the message to the provider.   Have a great rest of your day!

## 2024-10-14 NOTE — PROGRESS NOTES
[facility]  [address] [place] [address]  Phone: [phone]  Fax: [phone]      Date of Visit: 10/16/2024   Patient Name: Laly Mcnamara   Patient :  1990     ASSESSMENT/PLAN   1. Class 2 obesity due to excess calories without serious comorbidity with body mass index (BMI) of 35.0 to 35.9 in adult  2. Dyslipidemia  -     Semaglutide-Weight Management (WEGOVY) 0.5 MG/0.5ML SOAJ SC injection; Inject 0.5 mg into the skin every 7 days, Disp-3 mL, R-0Normal  Continue low fat, low salt diet  Continue to exercise for at least 150 min; majority of exercise should be at moderate or vigorous level of activity  4. Chronic migraine without aura without status migrainosus, not intractable, improved  -     venlafaxine (EFFEXOR XR) 37.5 MG extended release capsule; Take 1 capsule by mouth daily, Disp-30 capsule, R-0Normal  -     rizatriptan (MAXALT) 5 MG tablet; Take 1 tablet by mouth as needed for Migraine May repeat in 2 hours if needed, Disp-10 tablet, R-0Normal  Discontinue propranolol  Monitor of severity of migraines  5. Elevated BP without diagnosis of hypertension, controlled  Continue current exercise and diet habits  Continue to measure BP home and keep a log on your phone or message them to me   6. Immunization due  -     Influenza, AFLURIA Trivalent, (age 3 y+), IM, Preservative Free, 0.5mL  -     Pneumococcal, PCV20, PREVNAR 20, (age 6w+), IM, PF     Return in about 4 weeks (around 2024) for F/U Weight Management, Migraines, Blood Pressure.    COMMUNICATION   Please be aware portions of this note were completed using voice recognition software and unforeseen errors may have occurred.  Questions/concerns answered. Patient verbalized and expressed understanding. Medications, laboratory testing, imaging, consultation, and follow up as documented in this encounter.     Patient was seen and discussed with Abigail Sharma,

## 2024-10-16 ENCOUNTER — OFFICE VISIT (OUTPATIENT)
Age: 34
End: 2024-10-16
Payer: OTHER GOVERNMENT

## 2024-10-16 VITALS
DIASTOLIC BLOOD PRESSURE: 73 MMHG | HEIGHT: 64 IN | RESPIRATION RATE: 14 BRPM | HEART RATE: 73 BPM | WEIGHT: 200 LBS | TEMPERATURE: 97.6 F | SYSTOLIC BLOOD PRESSURE: 113 MMHG | BODY MASS INDEX: 34.15 KG/M2

## 2024-10-16 DIAGNOSIS — E78.5 DYSLIPIDEMIA: Primary | ICD-10-CM

## 2024-10-16 DIAGNOSIS — E66.811 CLASS 1 OBESITY DUE TO EXCESS CALORIES WITHOUT SERIOUS COMORBIDITY WITH BODY MASS INDEX (BMI) OF 34.0 TO 34.9 IN ADULT: ICD-10-CM

## 2024-10-16 DIAGNOSIS — Z23 IMMUNIZATION DUE: ICD-10-CM

## 2024-10-16 DIAGNOSIS — R03.0 ELEVATED BP WITHOUT DIAGNOSIS OF HYPERTENSION: ICD-10-CM

## 2024-10-16 DIAGNOSIS — E66.09 CLASS 1 OBESITY DUE TO EXCESS CALORIES WITHOUT SERIOUS COMORBIDITY WITH BODY MASS INDEX (BMI) OF 34.0 TO 34.9 IN ADULT: ICD-10-CM

## 2024-10-16 DIAGNOSIS — G43.709 CHRONIC MIGRAINE WITHOUT AURA WITHOUT STATUS MIGRAINOSUS, NOT INTRACTABLE: ICD-10-CM

## 2024-10-16 PROCEDURE — G8482 FLU IMMUNIZE ORDER/ADMIN: HCPCS | Performed by: FAMILY MEDICINE

## 2024-10-16 PROCEDURE — G0008 ADMIN INFLUENZA VIRUS VAC: HCPCS | Performed by: FAMILY MEDICINE

## 2024-10-16 PROCEDURE — 90677 PCV20 VACCINE IM: CPT | Performed by: FAMILY MEDICINE

## 2024-10-16 PROCEDURE — 90656 IIV3 VACC NO PRSV 0.5 ML IM: CPT | Performed by: FAMILY MEDICINE

## 2024-10-16 PROCEDURE — G0009 ADMIN PNEUMOCOCCAL VACCINE: HCPCS | Performed by: FAMILY MEDICINE

## 2024-10-16 PROCEDURE — 99214 OFFICE O/P EST MOD 30 MIN: CPT | Performed by: FAMILY MEDICINE

## 2024-10-16 PROCEDURE — G8427 DOCREV CUR MEDS BY ELIG CLIN: HCPCS | Performed by: FAMILY MEDICINE

## 2024-10-16 PROCEDURE — G8417 CALC BMI ABV UP PARAM F/U: HCPCS | Performed by: FAMILY MEDICINE

## 2024-10-16 PROCEDURE — 90656 IIV3 VACC NO PRSV 0.5 ML IM: CPT

## 2024-10-16 PROCEDURE — 1036F TOBACCO NON-USER: CPT | Performed by: FAMILY MEDICINE

## 2024-10-16 RX ORDER — RIZATRIPTAN BENZOATE 5 MG/1
5 TABLET ORAL PRN
Qty: 10 TABLET | Refills: 0 | Status: SHIPPED | OUTPATIENT
Start: 2024-10-16

## 2024-10-16 RX ORDER — VENLAFAXINE HYDROCHLORIDE 37.5 MG/1
37.5 CAPSULE, EXTENDED RELEASE ORAL DAILY
Qty: 30 CAPSULE | Refills: 0 | Status: SHIPPED | OUTPATIENT
Start: 2024-10-16 | End: 2024-11-15

## 2024-11-03 ENCOUNTER — PATIENT MESSAGE (OUTPATIENT)
Age: 34
End: 2024-11-03

## 2024-11-03 DIAGNOSIS — E66.09 CLASS 1 OBESITY DUE TO EXCESS CALORIES WITHOUT SERIOUS COMORBIDITY WITH BODY MASS INDEX (BMI) OF 34.0 TO 34.9 IN ADULT: ICD-10-CM

## 2024-11-03 DIAGNOSIS — E66.811 CLASS 1 OBESITY DUE TO EXCESS CALORIES WITHOUT SERIOUS COMORBIDITY WITH BODY MASS INDEX (BMI) OF 34.0 TO 34.9 IN ADULT: ICD-10-CM

## 2024-11-03 DIAGNOSIS — E78.5 DYSLIPIDEMIA: ICD-10-CM

## 2024-11-07 ENCOUNTER — PROCEDURE VISIT (OUTPATIENT)
Age: 34
End: 2024-11-07
Payer: OTHER GOVERNMENT

## 2024-11-07 VITALS
WEIGHT: 197 LBS | BODY MASS INDEX: 33.63 KG/M2 | SYSTOLIC BLOOD PRESSURE: 124 MMHG | HEIGHT: 64 IN | DIASTOLIC BLOOD PRESSURE: 75 MMHG | HEART RATE: 88 BPM | TEMPERATURE: 97.9 F | RESPIRATION RATE: 16 BRPM

## 2024-11-07 DIAGNOSIS — M99.01 SOMATIC DYSFUNCTION OF SPINE, CERVICAL: ICD-10-CM

## 2024-11-07 DIAGNOSIS — M99.02 SOMATIC DYSFUNCTION OF THORACIC REGION: ICD-10-CM

## 2024-11-07 DIAGNOSIS — M99.00 SOMATIC DYSFUNCTION OF HEAD REGION: ICD-10-CM

## 2024-11-07 DIAGNOSIS — M54.2 NECK PAIN: Primary | ICD-10-CM

## 2024-11-07 PROCEDURE — 98926 OSTEOPATH MANJ 3-4 REGIONS: CPT

## 2024-11-07 NOTE — PROGRESS NOTES
Frequency of Communication with Friends and Family: More than three times a week     Frequency of Social Gatherings with Friends and Family: Once a week     Attends Yarsanism Services: More than 4 times per year     Active Member of Clubs or Organizations: No     Attends Club or Organization Meetings: Never     Marital Status:    Intimate Partner Violence: Not At Risk (10/9/2023)    Humiliation, Afraid, Rape, and Kick questionnaire     Fear of Current or Ex-Partner: No     Emotionally Abused: No     Physically Abused: No     Sexually Abused: No   Housing Stability: Unknown (9/17/2024)    Housing Stability Vital Sign     Unable to Pay for Housing in the Last Year: No     Homeless in the Last Year: No        PHYSICAL EXAM     /75   Pulse 88   Temp 97.9 °F (36.6 °C) (Oral)   Resp 16   Ht 1.626 m (5' 4\")   Wt 89.4 kg (197 lb)   BMI 33.81 kg/m²    Exam:  General: alert, NAD, healthy appearing, normal voice/communication, well nourished  Pulmonary: No respiratory distress, normal respiratory rhythm and effort  MSK: no involuntary movements, negative Spurling test, no bony tenderness to palpation  Psych: alert and oriented, appropriately conversational    OMT: Procedure    Somatic Dysfuntion  Area Exam  Findings   CS   HVLA   ME   MFR   ST   CR   ART   RES   IMP   SAME   WORSE   M99.80 (Head) Increased tension to occiput    X     X     M99.01 (Neck) C3 Translated right   X      X     M99.02 (Thoracic) T6 ERS right   X      X     M99.03 (Lumbar)               M99.04 (Sacrum)               M99.05 (Pelvis)               M99.06 (LE)               M99.07 (UE)               M99.08 (Rib)               M99.09 (abd/  Other)                   ASSESSMENT/PLAN     1. Neck pain  2. Somatic dysfunction of spine, cervical  3. Somatic dysfunction of thoracic region  4. Somatic dysfunction of head region     Risk and benefits of OMT were discussed and patient was agreeable to treatment.  Treatment details noted above.

## 2024-11-07 NOTE — PATIENT INSTRUCTIONS
Thank you for following up with us at [facility] office. It was a pleasure to meet you today!     Our plan is the following:  I will include instructions on exercises for your neck that can help with neck pain.  Over the next few days, I would recommend drinking plenty of water and avoiding heavy lifting.  If your pain returns we can reschedule a follow up visit in 3-4 weeks for further treatments    Return if symptoms worsen or fail to improve.    OMT Patient Instructions:  -Drink plenty of water next 24 to 48 hours.  -Avoid any heavy activity over the next 24-48 hours, but you may resume your activities as tolerated.  Be sure to work on your home exercises and stretching the affected area(s) several times per day.  -Sometimes patients do feel sore after OMT.  They may also experience mild flu like symptoms; drinking plenty of water and/or taking Tylenol/NSAIDs as needed for any pain or discomfort can help.   -It may take more than one OMT appointment to feel better.  Therefore, we may need to see you back for an additional treatment(s).     You can also call the office if you have any additional questions or concerns and speak to one of the staff. They will triage and forward the message to the provider.   Have a great rest of your day!

## 2024-12-04 ENCOUNTER — OFFICE VISIT (OUTPATIENT)
Age: 34
End: 2024-12-04
Payer: OTHER GOVERNMENT

## 2024-12-04 VITALS
RESPIRATION RATE: 14 BRPM | DIASTOLIC BLOOD PRESSURE: 68 MMHG | BODY MASS INDEX: 32.74 KG/M2 | WEIGHT: 191.8 LBS | SYSTOLIC BLOOD PRESSURE: 108 MMHG | TEMPERATURE: 97.6 F | HEIGHT: 64 IN | HEART RATE: 86 BPM

## 2024-12-04 DIAGNOSIS — E66.811 CLASS 1 OBESITY DUE TO EXCESS CALORIES WITHOUT SERIOUS COMORBIDITY WITH BODY MASS INDEX (BMI) OF 32.0 TO 32.9 IN ADULT: ICD-10-CM

## 2024-12-04 DIAGNOSIS — Z91.030 BEE STING ALLERGY: ICD-10-CM

## 2024-12-04 DIAGNOSIS — E66.09 CLASS 1 OBESITY DUE TO EXCESS CALORIES WITHOUT SERIOUS COMORBIDITY WITH BODY MASS INDEX (BMI) OF 32.0 TO 32.9 IN ADULT: ICD-10-CM

## 2024-12-04 DIAGNOSIS — Z79.899 MEDICATION MANAGEMENT: ICD-10-CM

## 2024-12-04 DIAGNOSIS — G43.709 CHRONIC MIGRAINE WITHOUT AURA WITHOUT STATUS MIGRAINOSUS, NOT INTRACTABLE: Primary | ICD-10-CM

## 2024-12-04 PROCEDURE — 99212 OFFICE O/P EST SF 10 MIN: CPT

## 2024-12-04 RX ORDER — ONDANSETRON 4 MG/1
4 TABLET, ORALLY DISINTEGRATING ORAL EVERY 8 HOURS PRN
COMMUNITY
Start: 2024-11-08

## 2024-12-04 RX ORDER — EPINEPHRINE 0.3 MG/.3ML
0.3 INJECTION SUBCUTANEOUS ONCE
Qty: 0.6 ML | Refills: 0 | Status: SHIPPED | OUTPATIENT
Start: 2024-12-04 | End: 2024-12-04

## 2024-12-04 RX ORDER — RIZATRIPTAN BENZOATE 5 MG/1
5 TABLET ORAL PRN
Qty: 10 TABLET | Refills: 0 | Status: SHIPPED | OUTPATIENT
Start: 2024-12-04

## 2024-12-04 NOTE — PATIENT INSTRUCTIONS
Thank you for following up with us at [facility] office. It was a pleasure to meet you today!     Our plan is the following:  Obesity  Continue current exercise regimen; helps decrease excessive food intake  Continue current diet: portion control, minimizing fatty foods and sweets; provide more information on diet  Find winter equivalent to daily lunch walks  Continue wegovy at 1 ml dose  Call the office and leave message in 1-1.5 weeks to let me know if you're still tolerating dose before I increase dose in 2 weeks  Chronic Migraines, Controlled  Continue qulipta; take nightly  Refill maxalt medication  Avoid triggers such as dehydration    Return to clinic in 4 weeks for weight management and migraines follow up    Your medication list is included in the after visit summary. If you find any differences when compared to your medications at home, please contact the office ([phone]) to let us know.   You can also call the office if you have any additional questions or concerns and speak to one of the staff. They will triage and forward the message to the provider.   Have a great rest of your day!

## 2024-12-04 NOTE — PROGRESS NOTES
History:   Procedure Laterality Date    UPPER GASTROINTESTINAL ENDOSCOPY  January 2024    WISDOM TOOTH EXTRACTION  2007        Social History     Socioeconomic History    Marital status:      Spouse name: None    Number of children: 3    Years of education: None    Highest education level: None   Tobacco Use    Smoking status: Never    Smokeless tobacco: Never   Vaping Use    Vaping status: Never Used   Substance and Sexual Activity    Alcohol use: Yes     Alcohol/week: 2.0 standard drinks of alcohol     Types: 2 Glasses of wine per week     Comment: social drinker. Not an every week occurance    Drug use: Never    Sexual activity: Yes     Partners: Male     Comment: . 3 kids...   Social History Narrative    ** Merged History Encounter **          Social Determinants of Health     Financial Resource Strain: Low Risk  (9/17/2024)    Overall Financial Resource Strain (CARDIA)     Difficulty of Paying Living Expenses: Not very hard   Food Insecurity: No Food Insecurity (9/17/2024)    Hunger Vital Sign     Worried About Running Out of Food in the Last Year: Never true     Ran Out of Food in the Last Year: Never true   Transportation Needs: Unknown (9/17/2024)    PRAPARE - Transportation     Lack of Transportation (Non-Medical): No   Physical Activity: Insufficiently Active (10/9/2023)    Exercise Vital Sign     Days of Exercise per Week: 2 days     Minutes of Exercise per Session: 30 min   Stress: No Stress Concern Present (10/9/2023)    [place] [employer] - Occupational Stress Questionnaire     Feeling of Stress : Not at all   Social Connections: Moderately Integrated (6/17/2024)    Social Connection and Isolation Panel [NHANES]     Frequency of Communication with Friends and Family: More than three times a week     Frequency of Social Gatherings with Friends and Family: Once a week     Attends Voodoo Services: More than 4 times per year     Active Member of Clubs or

## 2024-12-12 DIAGNOSIS — E66.09 CLASS 1 OBESITY DUE TO EXCESS CALORIES WITHOUT SERIOUS COMORBIDITY WITH BODY MASS INDEX (BMI) OF 34.0 TO 34.9 IN ADULT: ICD-10-CM

## 2024-12-12 DIAGNOSIS — E66.811 CLASS 1 OBESITY DUE TO EXCESS CALORIES WITHOUT SERIOUS COMORBIDITY WITH BODY MASS INDEX (BMI) OF 34.0 TO 34.9 IN ADULT: ICD-10-CM

## 2024-12-12 DIAGNOSIS — E66.811 CLASS 1 OBESITY DUE TO EXCESS CALORIES WITHOUT SERIOUS COMORBIDITY WITH BODY MASS INDEX (BMI) OF 32.0 TO 32.9 IN ADULT: Primary | ICD-10-CM

## 2024-12-12 DIAGNOSIS — E66.09 CLASS 1 OBESITY DUE TO EXCESS CALORIES WITHOUT SERIOUS COMORBIDITY WITH BODY MASS INDEX (BMI) OF 32.0 TO 32.9 IN ADULT: Primary | ICD-10-CM

## 2024-12-12 NOTE — PROGRESS NOTES
Received pharmacy request that patient wants higher does of wegovy. At last office visit, patient was instructed to reach out if they were tolerating the 1mg dose and wanted to increase the dosage of wegovy. Will prescribe wegovy 1.7mg/0.75ml and follow up with patient in 4 weeks before further medication adjustment.   Medical Necessity Clause: This procedure was medically necessary because the lesions that were treated were: Detail Level: Detailed Consent: The patient's consent was obtained including but not limited to risks of crusting, scabbing, blistering, scarring, darker or lighter pigmentary change, recurrence, incomplete removal and infection. Show Topical Anesthesia Variable?: Yes Medical Necessity Information: It is in your best interest to select a reason for this procedure from the list below. All of these items fulfill various CMS LCD requirements except the new and changing color options. Post-Care Instructions: I reviewed with the patient in detail post-care instructions. Patient is to wear sunprotection, and avoid picking at any of the treated lesions. Pt may apply Vaseline to crusted or scabbing areas. Duration Of Freeze Thaw-Cycle (Seconds): 3 Spray Paint Text: The liquid nitrogen was applied to the skin utilizing a spray paint frosting technique. Spray Paint Technique: No Number Of Freeze-Thaw Cycles: 4 freeze-thaw cycles

## 2024-12-31 DIAGNOSIS — G43.709 CHRONIC MIGRAINE WITHOUT AURA WITHOUT STATUS MIGRAINOSUS, NOT INTRACTABLE: ICD-10-CM

## 2024-12-31 RX ORDER — RIZATRIPTAN BENZOATE 5 MG/1
TABLET ORAL
Qty: 10 TABLET | Refills: 0 | Status: SHIPPED | OUTPATIENT
Start: 2024-12-31 | End: 2025-02-27 | Stop reason: SDUPTHER

## 2024-12-31 RX ORDER — ATOGEPANT 60 MG/1
1 TABLET ORAL DAILY
Qty: 30 TABLET | Refills: 1 | Status: SHIPPED | OUTPATIENT
Start: 2024-12-31 | End: 2025-02-10 | Stop reason: SDUPTHER

## 2025-01-11 DIAGNOSIS — E66.09 CLASS 1 OBESITY DUE TO EXCESS CALORIES WITHOUT SERIOUS COMORBIDITY WITH BODY MASS INDEX (BMI) OF 32.0 TO 32.9 IN ADULT: ICD-10-CM

## 2025-01-11 DIAGNOSIS — E66.811 CLASS 1 OBESITY DUE TO EXCESS CALORIES WITHOUT SERIOUS COMORBIDITY WITH BODY MASS INDEX (BMI) OF 32.0 TO 32.9 IN ADULT: ICD-10-CM

## 2025-01-13 RX ORDER — SEMAGLUTIDE 1.7 MG/.75ML
INJECTION, SOLUTION SUBCUTANEOUS
Qty: 3 ML | Refills: 0 | Status: SHIPPED | OUTPATIENT
Start: 2025-01-13

## 2025-01-20 ENCOUNTER — OFFICE VISIT (OUTPATIENT)
Age: 35
End: 2025-01-20
Payer: OTHER GOVERNMENT

## 2025-01-20 VITALS
BODY MASS INDEX: 30.38 KG/M2 | TEMPERATURE: 97 F | WEIGHT: 177 LBS | RESPIRATION RATE: 18 BRPM | SYSTOLIC BLOOD PRESSURE: 118 MMHG | DIASTOLIC BLOOD PRESSURE: 85 MMHG | HEART RATE: 85 BPM

## 2025-01-20 DIAGNOSIS — E66.811 CLASS 1 OBESITY DUE TO EXCESS CALORIES WITHOUT SERIOUS COMORBIDITY WITH BODY MASS INDEX (BMI) OF 30.0 TO 30.9 IN ADULT: Primary | ICD-10-CM

## 2025-01-20 DIAGNOSIS — E66.09 CLASS 1 OBESITY DUE TO EXCESS CALORIES WITHOUT SERIOUS COMORBIDITY WITH BODY MASS INDEX (BMI) OF 30.0 TO 30.9 IN ADULT: Primary | ICD-10-CM

## 2025-01-20 DIAGNOSIS — G43.709 CHRONIC MIGRAINE WITHOUT AURA WITHOUT STATUS MIGRAINOSUS, NOT INTRACTABLE: ICD-10-CM

## 2025-01-20 PROCEDURE — 99212 OFFICE O/P EST SF 10 MIN: CPT

## 2025-01-20 PROCEDURE — 99214 OFFICE O/P EST MOD 30 MIN: CPT | Performed by: FAMILY MEDICINE

## 2025-01-20 SDOH — ECONOMIC STABILITY: INCOME INSECURITY: IN THE LAST 12 MONTHS, WAS THERE A TIME WHEN YOU WERE NOT ABLE TO PAY THE MORTGAGE OR RENT ON TIME?: NO

## 2025-01-20 SDOH — ECONOMIC STABILITY: FOOD INSECURITY: WITHIN THE PAST 12 MONTHS, THE FOOD YOU BOUGHT JUST DIDN'T LAST AND YOU DIDN'T HAVE MONEY TO GET MORE.: NEVER TRUE

## 2025-01-20 SDOH — ECONOMIC STABILITY: TRANSPORTATION INSECURITY
IN THE PAST 12 MONTHS, HAS LACK OF TRANSPORTATION KEPT YOU FROM MEETINGS, WORK, OR FROM GETTING THINGS NEEDED FOR DAILY LIVING?: YES

## 2025-01-20 SDOH — ECONOMIC STABILITY: TRANSPORTATION INSECURITY
IN THE PAST 12 MONTHS, HAS THE LACK OF TRANSPORTATION KEPT YOU FROM MEDICAL APPOINTMENTS OR FROM GETTING MEDICATIONS?: YES

## 2025-01-20 SDOH — ECONOMIC STABILITY: FOOD INSECURITY: WITHIN THE PAST 12 MONTHS, YOU WORRIED THAT YOUR FOOD WOULD RUN OUT BEFORE YOU GOT MONEY TO BUY MORE.: NEVER TRUE

## 2025-01-20 ASSESSMENT — PATIENT HEALTH QUESTIONNAIRE - PHQ9
SUM OF ALL RESPONSES TO PHQ QUESTIONS 1-9: 0
SUM OF ALL RESPONSES TO PHQ QUESTIONS 1-9: 0
2. FEELING DOWN, DEPRESSED OR HOPELESS: NOT AT ALL
SUM OF ALL RESPONSES TO PHQ QUESTIONS 1-9: 0
SUM OF ALL RESPONSES TO PHQ QUESTIONS 1-9: 0
SUM OF ALL RESPONSES TO PHQ9 QUESTIONS 1 & 2: 0
1. LITTLE INTEREST OR PLEASURE IN DOING THINGS: NOT AT ALL

## 2025-01-20 NOTE — PROGRESS NOTES
Laly Mcnamara is a 35 y.o. female who presents today to discuss No chief complaint on file.  A) Chronic Migraines     Patient endorsed _1_ture migraines in a _1_ month period; 2 headaches occasionanly  Patient endorsed/denies headaches controlled with Qulipta nightly  Patient endorses/denies side effects  Got refilled on maxalt  Will need qulipta renewed once run out; prior auth output     B) Patient endorses weight loss of about _1-2_ lbs per week  Patient endorses/denies tolerance of new dose  Patient endorses/denies side effects: nausea, constipation , constipations  Diet: protein shake in the mornning; nothing in the morming; struggle to eat th erest of day; usually protein(chicken, salad, cut up vegetables); small dinner- varibales  Exercise: slower- col dweather, holiday travel; 2x/week walking; trying to get to gym; plan to do small weights routine  Probiotic- taking for it; not made constipatior or better; wanted to do this instead of miralax/colacase; taking for about a week     Pt denies ***. Endorses ***.

## 2025-01-20 NOTE — PROGRESS NOTES
[facility]  [address] [place] [address]  Phone: [phone]  Fax: [phone]      Date of Visit: 2025   Patient Name: Laly Mcnamara   Patient :  1990     ASSESSMENT/PLAN   1. Class 1 obesity due to excess calories without serious comorbidity with body mass index (BMI) of 30.0 to 30.9 in adult  - Improving  - Per chart review, patient has lost 29 lbs since starting wegovy in 2024 (206 lbs on ; 177 lbs on )  - Consider Miralax, Colace, and lowering wegovy dose if wegovy-induced constipation is not responsive to conservative management  2. Chronic migraine without aura without status migrainosus, not intractable   - Controlled   -  Continue current migraine regimen: Qulipta 60 mg nightly and Maxalt 5 mg PRN      I saw and evaluated the patient, performing the key elements of the service.  I discussed the findings, assessment and plan with the resident and agree with the resident's findings and plan as documented in the resident's note.   Patient Instructions   Thank you for following up with us at [facility] office. It was a pleasure to meet you today!     Our plan is the follong:  Weight Management and Migraine Follow up  Great job on the weight loss!!!  Continue wegovy at current 1.7 mg dose  Increase intake of water, vegetables, and fruits  Try bran flakes (example, Uncle Jason's)   Continue Qulipta at current dose  Continue to take Maxalt as needed  I have included information on constipation  Return to the clinic in one month    Your medication list is included in the after visit summary. If you find any differences when compared to your medications at home, please contact the office ([phone]) to let us know.   You can also call the office if you have any additional questions or concerns and speak to one of the staff. They will triage and forward the message to the

## 2025-01-20 NOTE — PATIENT INSTRUCTIONS
Thank you for following up with us at [facility] office. It was a pleasure to meet you today!     Our plan is the follong:  Weight Management and Migraine Follow up  Great job on the weight loss!!!  Continue wegovy at current 1.7 mg dose  Increase intake of water, vegetables, and fruits  Try  bran flakes (example, Uncle Jason's)   Continue Qulipta at current dose  Continue to take Maxalt as needed  I have included information on constipation  Return to the clinic in one month    Your medication list is included in the after visit summary. If you find any differences when compared to your medications at home, please contact the office ([phone]) to let us know.   You can also call the office if you have any additional questions or concerns and speak to one of the staff. They will triage and forward the message to the provider.   Have a great rest of your day!

## 2025-01-24 NOTE — PROGRESS NOTES
[facility]  [address] [place] [address]  Phone: [phone]  Fax: [phone]      Date of Visit: 2025   Patient Name: Laly Mcnamara   Patient :  1990     ASSESSMENT/PLAN   1. Class 1 obesity due to excess calories without serious comorbidity with body mass index (BMI) of 30.0 to 30.9 in adult  - Improving  - Per chart review, patient has lost 29 lbs since starting wegovy in 2024 (206 lbs on ; 177 lbs on )  - Consider Miralax, Colace, and lowering wegovy dose if wegovy-induced constipation is not responsive to conservative management  2. Chronic migraine without aura without status migrainosus, not intractable  - Improving  - Per chart review, patient has lost 29 lbs since starting wegovy in 2024 (206 lbs on ; 177 lbs on )  - Consider Miralax, Colace, and lowering wegovy dose if wegovy-induced constipation is not responsive to conservative management    Patient Instructions   Thank you for following up with us at [facility] office. It was a pleasure to meet you today!     Our plan is the follong:  Weight Management and Migraine Follow up  Great job on the weight loss!!!  Continue wegovy at current 1.7 mg dose  Increase intake of water, vegetables, and fruits  Try  bran flakes (example, Uncle Jason's)   Continue Qulipta at current dose  Continue to take Maxalt as needed  I have included information on constipation  Return to the clinic in one month    Your medication list is included in the after visit summary. If you find any differences when compared to your medications at home, please contact the office ([phone]) to let us know.   You can also call the office if you have any additional questions or concerns and speak to one of the staff. They will triage and forward the message to the provider.   Have a great rest of your day!       Return in about 4  Risk Statement (NON-critical care)

## 2025-02-08 DIAGNOSIS — E66.811 CLASS 1 OBESITY DUE TO EXCESS CALORIES WITHOUT SERIOUS COMORBIDITY WITH BODY MASS INDEX (BMI) OF 32.0 TO 32.9 IN ADULT: ICD-10-CM

## 2025-02-08 DIAGNOSIS — G43.709 CHRONIC MIGRAINE WITHOUT AURA WITHOUT STATUS MIGRAINOSUS, NOT INTRACTABLE: ICD-10-CM

## 2025-02-08 DIAGNOSIS — E66.09 CLASS 1 OBESITY DUE TO EXCESS CALORIES WITHOUT SERIOUS COMORBIDITY WITH BODY MASS INDEX (BMI) OF 32.0 TO 32.9 IN ADULT: ICD-10-CM

## 2025-02-10 RX ORDER — SEMAGLUTIDE 1.7 MG/.75ML
INJECTION, SOLUTION SUBCUTANEOUS
Qty: 3 ML | Refills: 0 | Status: SHIPPED | OUTPATIENT
Start: 2025-02-10

## 2025-02-10 RX ORDER — ATOGEPANT 60 MG/1
1 TABLET ORAL DAILY
Qty: 30 TABLET | Refills: 1 | Status: SHIPPED | OUTPATIENT
Start: 2025-02-10

## 2025-02-27 DIAGNOSIS — G43.709 CHRONIC MIGRAINE WITHOUT AURA WITHOUT STATUS MIGRAINOSUS, NOT INTRACTABLE: ICD-10-CM

## 2025-02-28 RX ORDER — RIZATRIPTAN BENZOATE 5 MG/1
5 TABLET ORAL PRN
Qty: 10 TABLET | Refills: 0 | Status: SHIPPED | OUTPATIENT
Start: 2025-02-28

## 2025-03-05 ENCOUNTER — OFFICE VISIT (OUTPATIENT)
Age: 35
End: 2025-03-05
Payer: OTHER GOVERNMENT

## 2025-03-05 VITALS
HEART RATE: 84 BPM | WEIGHT: 167.6 LBS | RESPIRATION RATE: 18 BRPM | BODY MASS INDEX: 28.77 KG/M2 | SYSTOLIC BLOOD PRESSURE: 127 MMHG | DIASTOLIC BLOOD PRESSURE: 75 MMHG | TEMPERATURE: 97.2 F

## 2025-03-05 DIAGNOSIS — K59.00 CONSTIPATION, UNSPECIFIED CONSTIPATION TYPE: Primary | ICD-10-CM

## 2025-03-05 DIAGNOSIS — E66.3 OVERWEIGHT (BMI 25.0-29.9): ICD-10-CM

## 2025-03-05 PROCEDURE — 99212 OFFICE O/P EST SF 10 MIN: CPT

## 2025-03-05 PROCEDURE — 99213 OFFICE O/P EST LOW 20 MIN: CPT

## 2025-03-05 NOTE — PATIENT INSTRUCTIONS
Thank you for following up with us at [facility] office. It was a pleasure to meet you today!     Our plan is the following:  Constipation  Provided info on fiber foods  Keep hydrated with plenty of water  Will reduce wegovy to lower dose  Return in 1 month  Come back sooner if symptoms worsen    Your medication list is included in the after visit summary. If you find any differences when compared to your medications at home, please contact the office ([phone]) to let us know.   You can also call the office if you have any additional questions or concerns and speak to one of the staff. They will triage and forward the message to the provider.   Have a great rest of your day!

## 2025-03-05 NOTE — PROGRESS NOTES
[facility]  [address] [place] [address]  Phone: [phone]  Fax: [phone]      Date of Visit: 3/5/2025   Patient Name: Laly Mcnamara   Patient :  1990     ASSESSMENT/PLAN   1. Constipation, unspecified constipation type  2. Overweight (BMI 25.0-29.9)  -     Semaglutide-Weight Management (WEGOVY) 1 MG/0.5ML SOAJ SC injection; Inject 1 mg into the skin every 7 days, Disp-2 mL, R-0Normal     Patient is presenting with persistent constipation that may be medication induced given patient is taking wegovy, had a benign abdominal exam,  and that pertinent ROS was negative for abdominal pain, vomit, and hematochezia. I advised patient on lifestyle changes(hydration, fiber rich foods). I have reduced wegovy to 1 mg. If constipation persists, I will look for other secondary causes of constipation.    Patient Instructions   Thank you for following up with us at [facility] office. It was a pleasure to meet you today!     Our plan is the following:  Constipation  Provided info on fiber foods  Keep hydrated with plenty of water  Will reduce wegovy to lower dose  Return in 1 month  Come back sooner if symptoms worsen    Your medication list is included in the after visit summary. If you find any differences when compared to your medications at home, please contact the office ([phone]) to let us know.   You can also call the office if you have any additional questions or concerns and speak to one of the staff. They will triage and forward the message to the provider.   Have a great rest of your day!       Return in about 4 weeks (around 2025).    COMMUNICATION   Please be aware portions of this note were completed using voice recognition software and unforeseen errors may have occurred.  Questions/concerns answered. Patient verbalized and expressed understanding. Medications, laboratory

## 2025-04-06 NOTE — PROGRESS NOTES
[facility]  [address] [place] [address]  Phone: [phone]  Fax: [phone]      Date of Visit: 2025   Patient Name: Laly Mcnamara   Patient :  1990     ASSESSMENT/PLAN   1. Overweight (BMI 25.0-29.9), Improved  -     Semaglutide-Weight Management (WEGOVY) 1 MG/0.5ML SOAJ SC injection; Inject 1 mg into the skin every 7 days, Disp-2 mL, R-0Normal  - Lost 42 lbs (206 lbs in 2024 to 164 lbs on 24)  2. Drug induced constipation, Improved   - Likely medication (wegovy-induced) given improvement after lowering dose             - D/Dx: CRC less likely given absence of hematochezia/dark stools/hx of anemia/absence of first degree relatives with CRC                          Diabetes unlikely given Hgb A1c WNL (Last Hgb A1c done 24: 4.8%)  3. Anxiety            - Discussed conservative management vs medications            -  Patient declined medication at this time; provided with information on lexapro, wellbutrin, and antidepressants            - Continue therapy; will send information on non medication anxiety treatments via [employer]           - Will obtain JOCELINE 7 at future visit    Patient Instructions   Thank you for following up with us at [facility] office. It was a pleasure to meet you today!     Our plan is the following:  Anxiety: Read about wellbutrin vs lexapro, continue therapy  Weight Management  Great job in the continued weight loss  Will refill wegovy at lower dose  Constipation  Continue to eat plenty of fruits and vegetables; keep hydrated    Return to the clinic in about 1 month for weight management follow up  Return sooner for other concerns    Your medication list is included in the after visit summary. If you find any differences when compared to your medications at home, please contact the office ([phone]) to let us know.   You can also call the

## 2025-04-07 NOTE — PATIENT INSTRUCTIONS
Thank you for following up with us at [facility] office. It was a pleasure to meet you today!     Our plan is the following:  Anxiety: Read about wellbutrin vs lexapro, continue therapy  Weight Management  Great job in the continued weight loss  Will refill wegovy at lower dose  Constipation  Continue to eat plenty of fruits and vegetables; keep hydrated    Return to the clinic in about 1 month for weight management follow up    Your medication list is included in the after visit summary. If you find any differences when compared to your medications at home, please contact the office ([phone]) to let us know.   You can also call the office if you have any additional questions or concerns and speak to one of the staff. They will triage and forward the message to the provider.   Have a great rest of your day!

## 2025-04-09 ENCOUNTER — OFFICE VISIT (OUTPATIENT)
Age: 35
End: 2025-04-09
Payer: OTHER GOVERNMENT

## 2025-04-09 VITALS
TEMPERATURE: 98.2 F | RESPIRATION RATE: 18 BRPM | HEART RATE: 88 BPM | WEIGHT: 164.9 LBS | BODY MASS INDEX: 28.31 KG/M2 | DIASTOLIC BLOOD PRESSURE: 68 MMHG | SYSTOLIC BLOOD PRESSURE: 110 MMHG

## 2025-04-09 DIAGNOSIS — K59.03 DRUG-INDUCED CONSTIPATION: Primary | ICD-10-CM

## 2025-04-09 DIAGNOSIS — F41.9 ANXIETY: ICD-10-CM

## 2025-04-09 DIAGNOSIS — E66.3 OVERWEIGHT (BMI 25.0-29.9): ICD-10-CM

## 2025-04-09 PROCEDURE — 99213 OFFICE O/P EST LOW 20 MIN: CPT

## 2025-04-09 PROCEDURE — 99212 OFFICE O/P EST SF 10 MIN: CPT

## 2025-05-01 DIAGNOSIS — G43.709 CHRONIC MIGRAINE WITHOUT AURA WITHOUT STATUS MIGRAINOSUS, NOT INTRACTABLE: ICD-10-CM

## 2025-05-04 RX ORDER — RIZATRIPTAN BENZOATE 5 MG/1
TABLET ORAL
Qty: 10 TABLET | Refills: 0 | Status: SHIPPED | OUTPATIENT
Start: 2025-05-04

## 2025-05-07 ENCOUNTER — OFFICE VISIT (OUTPATIENT)
Age: 35
End: 2025-05-07
Payer: OTHER GOVERNMENT

## 2025-05-07 VITALS
BODY MASS INDEX: 27.91 KG/M2 | DIASTOLIC BLOOD PRESSURE: 73 MMHG | WEIGHT: 162.6 LBS | HEART RATE: 86 BPM | RESPIRATION RATE: 16 BRPM | SYSTOLIC BLOOD PRESSURE: 115 MMHG | TEMPERATURE: 98.4 F

## 2025-05-07 DIAGNOSIS — G43.709 CHRONIC MIGRAINE WITHOUT AURA WITHOUT STATUS MIGRAINOSUS, NOT INTRACTABLE: ICD-10-CM

## 2025-05-07 DIAGNOSIS — F41.9 ANXIETY: ICD-10-CM

## 2025-05-07 DIAGNOSIS — E66.3 OVERWEIGHT (BMI 25.0-29.9): Primary | ICD-10-CM

## 2025-05-07 PROCEDURE — 99214 OFFICE O/P EST MOD 30 MIN: CPT | Performed by: FAMILY MEDICINE

## 2025-05-07 RX ORDER — LEVONORGESTREL 52 MG/1
1 INTRAUTERINE DEVICE INTRAUTERINE ONCE
COMMUNITY
Start: 2025-04-10

## 2025-05-07 ASSESSMENT — PATIENT HEALTH QUESTIONNAIRE - PHQ9
SUM OF ALL RESPONSES TO PHQ QUESTIONS 1-9: 0
SUM OF ALL RESPONSES TO PHQ QUESTIONS 1-9: 0
1. LITTLE INTEREST OR PLEASURE IN DOING THINGS: NOT AT ALL
SUM OF ALL RESPONSES TO PHQ QUESTIONS 1-9: 0
2. FEELING DOWN, DEPRESSED OR HOPELESS: NOT AT ALL
SUM OF ALL RESPONSES TO PHQ QUESTIONS 1-9: 0

## 2025-05-07 ASSESSMENT — ANXIETY QUESTIONNAIRES
5. BEING SO RESTLESS THAT IT IS HARD TO SIT STILL: SEVERAL DAYS
4. TROUBLE RELAXING: SEVERAL DAYS
3. WORRYING TOO MUCH ABOUT DIFFERENT THINGS: SEVERAL DAYS
6. BECOMING EASILY ANNOYED OR IRRITABLE: MORE THAN HALF THE DAYS
1. FEELING NERVOUS, ANXIOUS, OR ON EDGE: MORE THAN HALF THE DAYS
2. NOT BEING ABLE TO STOP OR CONTROL WORRYING: SEVERAL DAYS
IF YOU CHECKED OFF ANY PROBLEMS ON THIS QUESTIONNAIRE, HOW DIFFICULT HAVE THESE PROBLEMS MADE IT FOR YOU TO DO YOUR WORK, TAKE CARE OF THINGS AT HOME, OR GET ALONG WITH OTHER PEOPLE: SOMEWHAT DIFFICULT
GAD7 TOTAL SCORE: 9
7. FEELING AFRAID AS IF SOMETHING AWFUL MIGHT HAPPEN: SEVERAL DAYS

## 2025-05-07 NOTE — PROGRESS NOTES
[facility]  [address] [place] [address]  Phone: [phone]  Fax: [phone]      Date of Visit: 2025   Patient Name: Laly Mcnamara   Patient :  1990     ASSESSMENT/PLAN   1. Overweight (BMI 25.0-29.9)  2. Anxiety  3. Chronic migraine without aura without status migrainosus, not intractable       Patient Instructions   Thank you for following up with us at [facility] office. It was a pleasure to meet you today!     Our plan is the following:  Weight management/anxiety/migraine follow-up  Continue Wegovy at dose of 1 mg; will refill as needed  Continue exercising at home and when you travel  Continue going to therapy and going on walks to help manage her anxiety  We can start anxiety medication whenever you are ready and/or interested  Your migraines are controlled with Qulipta and Maxalt; will refill medications as needed  Great job on losing 40 something pounds since starting Wegovy in September keep it up  Enjoy your cruise  Return to the clinic in 1 month for follow-up  Return to the clinic for other concerns    Your medication list is included in the after visit summary. If you find any differences when compared to your medications at home, please contact the office ([phone]) to let us know.   You can also call the office if you have any additional questions or concerns and speak to one of the staff. They will triage and forward the message to the provider.   Have a great rest of your day!       Return in about 4 weeks (around 2025) for Weight Mangement/Anxiety/Migraines.    COMMUNICATION   Please be aware portions of this note were completed using voice recognition software and unforeseen errors may have occurred.  Questions/concerns answered. Patient verbalized and expressed understanding. Medications, laboratory testing, imaging, consultation, and follow up as

## 2025-05-07 NOTE — PATIENT INSTRUCTIONS
Thank you for following up with us at [facility] office. It was a pleasure to meet you today!     Our plan is the following:  Weight management/anxiety/migraine follow-up  Continue Wegovy at dose of 1 mg; will refill as needed  Continue exercising at home and when you travel  Continue going to therapy and going on walks to help manage her anxiety  We can start anxiety medication whenever you are ready and/or interested  Your migraines are controlled with Qulipta and Maxalt; will refill medications as needed  Great job on losing 40 something pounds since starting Wegovy in September keep it up  Enjoy your cruise  Return to the clinic in 1 month for follow-up  Return to the clinic for other concerns    Your medication list is included in the after visit summary. If you find any differences when compared to your medications at home, please contact the office ([phone]) to let us know.   You can also call the office if you have any additional questions or concerns and speak to one of the staff. They will triage and forward the message to the provider.   Have a great rest of your day!

## 2025-05-08 DIAGNOSIS — E66.3 OVERWEIGHT (BMI 25.0-29.9): ICD-10-CM

## 2025-05-08 DIAGNOSIS — G43.709 CHRONIC MIGRAINE WITHOUT AURA WITHOUT STATUS MIGRAINOSUS, NOT INTRACTABLE: ICD-10-CM

## 2025-05-08 RX ORDER — SEMAGLUTIDE 1 MG/.5ML
INJECTION, SOLUTION SUBCUTANEOUS
Qty: 2 ML | Refills: 0 | Status: SHIPPED | OUTPATIENT
Start: 2025-05-08

## 2025-05-08 RX ORDER — ATOGEPANT 60 MG/1
1 TABLET ORAL DAILY
Qty: 30 TABLET | Refills: 1 | Status: SHIPPED | OUTPATIENT
Start: 2025-05-08

## 2025-05-09 DIAGNOSIS — E66.3 OVERWEIGHT (BMI 25.0-29.9): ICD-10-CM

## 2025-05-09 RX ORDER — SEMAGLUTIDE 1 MG/.5ML
INJECTION, SOLUTION SUBCUTANEOUS
Qty: 2 ML | Refills: 0 | OUTPATIENT
Start: 2025-05-09

## 2025-06-10 NOTE — PROGRESS NOTES
at all   Social Connections: Moderately Integrated (6/17/2024)    Social Connection and Isolation Panel [NHANES]     Frequency of Communication with Friends and Family: More than three times a week     Frequency of Social Gatherings with Friends and Family: Once a week     Attends Sikh Services: More than 4 times per year     Active Member of Clubs or Organizations: No     Attends Club or Organization Meetings: Never     Marital Status:    Intimate Partner Violence: Not At Risk (10/9/2023)    Humiliation, Afraid, Rape, and Kick questionnaire     Fear of Current or Ex-Partner: No     Emotionally Abused: No     Physically Abused: No     Sexually Abused: No   Housing Stability: Low Risk  (1/20/2025)    Housing Stability Vital Sign     Unable to Pay for Housing in the Last Year: No     Number of Times Moved in the Last Year: 0     Homeless in the Last Year: No

## 2025-06-10 NOTE — PATIENT INSTRUCTIONS
Thank you for following up with us at [facility] office. It was a pleasure to meet you today!     Our plan is the following:  Weight management  Will continue to refill semaglutide  Continue to eat diet rich in vegetables/fruit and low in fat and sugar  Continue going to therapy for anxiety  Provided with insomnia/sleep hygiene, it might help you with your sleep issues  Will start on lexapro 10 mg daily for anxiety  Return to clinic in 1 month for follow-up    Your medication list is included in the after visit summary. If you find any differences when compared to your medications at home, please contact the office ([phone]) to let us know.   You can also call the office if you have any additional questions or concerns and speak to one of the staff. They will triage and forward the message to the provider.   Have a great rest of your day!

## 2025-06-11 ENCOUNTER — PATIENT MESSAGE (OUTPATIENT)
Age: 35
End: 2025-06-11

## 2025-06-11 ENCOUNTER — OFFICE VISIT (OUTPATIENT)
Age: 35
End: 2025-06-11
Payer: OTHER GOVERNMENT

## 2025-06-11 VITALS
SYSTOLIC BLOOD PRESSURE: 112 MMHG | BODY MASS INDEX: 27.64 KG/M2 | HEART RATE: 83 BPM | RESPIRATION RATE: 12 BRPM | DIASTOLIC BLOOD PRESSURE: 75 MMHG | WEIGHT: 161 LBS | TEMPERATURE: 98.3 F

## 2025-06-11 DIAGNOSIS — F41.9 ANXIETY: ICD-10-CM

## 2025-06-11 DIAGNOSIS — E66.3 OVERWEIGHT (BMI 25.0-29.9): Primary | ICD-10-CM

## 2025-06-11 PROCEDURE — 99214 OFFICE O/P EST MOD 30 MIN: CPT

## 2025-06-11 RX ORDER — SEMAGLUTIDE 1 MG/.5ML
1 INJECTION, SOLUTION SUBCUTANEOUS
Qty: 2 ML | Refills: 0 | Status: SHIPPED | OUTPATIENT
Start: 2025-06-11

## 2025-06-11 RX ORDER — ESCITALOPRAM OXALATE 10 MG/1
10 TABLET ORAL DAILY
Qty: 30 TABLET | Refills: 1 | Status: SHIPPED | OUTPATIENT
Start: 2025-06-11

## 2025-06-11 ASSESSMENT — PATIENT HEALTH QUESTIONNAIRE - PHQ9
SUM OF ALL RESPONSES TO PHQ QUESTIONS 1-9: 8
3. TROUBLE FALLING OR STAYING ASLEEP: MORE THAN HALF THE DAYS
SUM OF ALL RESPONSES TO PHQ QUESTIONS 1-9: 8
10. IF YOU CHECKED OFF ANY PROBLEMS, HOW DIFFICULT HAVE THESE PROBLEMS MADE IT FOR YOU TO DO YOUR WORK, TAKE CARE OF THINGS AT HOME, OR GET ALONG WITH OTHER PEOPLE: SOMEWHAT DIFFICULT
SUM OF ALL RESPONSES TO PHQ QUESTIONS 1-9: 8
1. LITTLE INTEREST OR PLEASURE IN DOING THINGS: SEVERAL DAYS
6. FEELING BAD ABOUT YOURSELF - OR THAT YOU ARE A FAILURE OR HAVE LET YOURSELF OR YOUR FAMILY DOWN: SEVERAL DAYS
SUM OF ALL RESPONSES TO PHQ QUESTIONS 1-9: 8
9. THOUGHTS THAT YOU WOULD BE BETTER OFF DEAD, OR OF HURTING YOURSELF: NOT AT ALL
7. TROUBLE CONCENTRATING ON THINGS, SUCH AS READING THE NEWSPAPER OR WATCHING TELEVISION: SEVERAL DAYS
5. POOR APPETITE OR OVEREATING: NOT AT ALL
4. FEELING TIRED OR HAVING LITTLE ENERGY: MORE THAN HALF THE DAYS
8. MOVING OR SPEAKING SO SLOWLY THAT OTHER PEOPLE COULD HAVE NOTICED. OR THE OPPOSITE, BEING SO FIGETY OR RESTLESS THAT YOU HAVE BEEN MOVING AROUND A LOT MORE THAN USUAL: SEVERAL DAYS
2. FEELING DOWN, DEPRESSED OR HOPELESS: NOT AT ALL

## 2025-06-27 DIAGNOSIS — G43.709 CHRONIC MIGRAINE WITHOUT AURA WITHOUT STATUS MIGRAINOSUS, NOT INTRACTABLE: ICD-10-CM

## 2025-06-30 RX ORDER — RIZATRIPTAN BENZOATE 5 MG/1
TABLET ORAL
Qty: 10 TABLET | Refills: 0 | Status: SHIPPED | OUTPATIENT
Start: 2025-06-30

## 2025-07-11 DIAGNOSIS — G43.709 CHRONIC MIGRAINE WITHOUT AURA WITHOUT STATUS MIGRAINOSUS, NOT INTRACTABLE: ICD-10-CM

## 2025-07-11 DIAGNOSIS — E66.3 OVERWEIGHT (BMI 25.0-29.9): ICD-10-CM

## 2025-07-11 RX ORDER — ATOGEPANT 60 MG/1
1 TABLET ORAL DAILY
Qty: 30 TABLET | Refills: 1 | Status: SHIPPED | OUTPATIENT
Start: 2025-07-11

## 2025-07-11 RX ORDER — SEMAGLUTIDE 1 MG/.5ML
INJECTION, SOLUTION SUBCUTANEOUS
Qty: 2 ML | Refills: 0 | Status: SHIPPED | OUTPATIENT
Start: 2025-07-11

## 2025-08-06 ENCOUNTER — OFFICE VISIT (OUTPATIENT)
Age: 35
End: 2025-08-06
Payer: COMMERCIAL

## 2025-08-06 VITALS
DIASTOLIC BLOOD PRESSURE: 78 MMHG | BODY MASS INDEX: 27.11 KG/M2 | RESPIRATION RATE: 16 BRPM | TEMPERATURE: 98.2 F | HEART RATE: 76 BPM | HEIGHT: 64 IN | WEIGHT: 158.8 LBS | SYSTOLIC BLOOD PRESSURE: 129 MMHG

## 2025-08-06 DIAGNOSIS — H69.93 DYSFUNCTION OF BOTH EUSTACHIAN TUBES: ICD-10-CM

## 2025-08-06 DIAGNOSIS — G43.709 CHRONIC MIGRAINE WITHOUT AURA WITHOUT STATUS MIGRAINOSUS, NOT INTRACTABLE: ICD-10-CM

## 2025-08-06 DIAGNOSIS — F41.9 ANXIETY AND DEPRESSION: Primary | ICD-10-CM

## 2025-08-06 DIAGNOSIS — F32.A ANXIETY AND DEPRESSION: Primary | ICD-10-CM

## 2025-08-06 DIAGNOSIS — E66.3 OVERWEIGHT (BMI 25.0-29.9): ICD-10-CM

## 2025-08-06 PROCEDURE — G8417 CALC BMI ABV UP PARAM F/U: HCPCS

## 2025-08-06 PROCEDURE — G8427 DOCREV CUR MEDS BY ELIG CLIN: HCPCS

## 2025-08-06 PROCEDURE — 99214 OFFICE O/P EST MOD 30 MIN: CPT

## 2025-08-06 PROCEDURE — 1036F TOBACCO NON-USER: CPT

## 2025-08-06 RX ORDER — ATOGEPANT 60 MG/1
1 TABLET ORAL DAILY
Qty: 30 TABLET | Refills: 1 | Status: SHIPPED | OUTPATIENT
Start: 2025-08-06

## 2025-08-06 RX ORDER — ESCITALOPRAM OXALATE 10 MG/1
10 TABLET ORAL DAILY
Qty: 30 TABLET | Refills: 1 | Status: SHIPPED | OUTPATIENT
Start: 2025-08-06

## 2025-08-06 ASSESSMENT — PATIENT HEALTH QUESTIONNAIRE - PHQ9
9. THOUGHTS THAT YOU WOULD BE BETTER OFF DEAD, OR OF HURTING YOURSELF: NOT AT ALL
5. POOR APPETITE OR OVEREATING: NOT AT ALL
7. TROUBLE CONCENTRATING ON THINGS, SUCH AS READING THE NEWSPAPER OR WATCHING TELEVISION: SEVERAL DAYS
6. FEELING BAD ABOUT YOURSELF - OR THAT YOU ARE A FAILURE OR HAVE LET YOURSELF OR YOUR FAMILY DOWN: MORE THAN HALF THE DAYS
SUM OF ALL RESPONSES TO PHQ QUESTIONS 1-9: 8
4. FEELING TIRED OR HAVING LITTLE ENERGY: MORE THAN HALF THE DAYS
SUM OF ALL RESPONSES TO PHQ QUESTIONS 1-9: 8
10. IF YOU CHECKED OFF ANY PROBLEMS, HOW DIFFICULT HAVE THESE PROBLEMS MADE IT FOR YOU TO DO YOUR WORK, TAKE CARE OF THINGS AT HOME, OR GET ALONG WITH OTHER PEOPLE: SOMEWHAT DIFFICULT
3. TROUBLE FALLING OR STAYING ASLEEP: MORE THAN HALF THE DAYS
2. FEELING DOWN, DEPRESSED OR HOPELESS: NOT AT ALL
1. LITTLE INTEREST OR PLEASURE IN DOING THINGS: SEVERAL DAYS
8. MOVING OR SPEAKING SO SLOWLY THAT OTHER PEOPLE COULD HAVE NOTICED. OR THE OPPOSITE, BEING SO FIGETY OR RESTLESS THAT YOU HAVE BEEN MOVING AROUND A LOT MORE THAN USUAL: NOT AT ALL